# Patient Record
Sex: FEMALE | Race: BLACK OR AFRICAN AMERICAN | Employment: FULL TIME | URBAN - METROPOLITAN AREA
[De-identification: names, ages, dates, MRNs, and addresses within clinical notes are randomized per-mention and may not be internally consistent; named-entity substitution may affect disease eponyms.]

---

## 2017-02-13 ENCOUNTER — OFFICE VISIT (OUTPATIENT)
Dept: INTERNAL MEDICINE CLINIC | Age: 62
End: 2017-02-13

## 2017-02-13 VITALS
BODY MASS INDEX: 27.99 KG/M2 | WEIGHT: 168 LBS | TEMPERATURE: 98.3 F | HEIGHT: 65 IN | SYSTOLIC BLOOD PRESSURE: 122 MMHG | HEART RATE: 90 BPM | DIASTOLIC BLOOD PRESSURE: 77 MMHG | RESPIRATION RATE: 12 BRPM

## 2017-02-13 DIAGNOSIS — I10 ESSENTIAL HYPERTENSION: ICD-10-CM

## 2017-02-13 DIAGNOSIS — F33.41 RECURRENT MAJOR DEPRESSIVE DISORDER, IN PARTIAL REMISSION (HCC): ICD-10-CM

## 2017-02-13 DIAGNOSIS — T38.0X5A STEROID-INDUCED DIABETES MELLITUS (HCC): Primary | ICD-10-CM

## 2017-02-13 DIAGNOSIS — Z12.31 VISIT FOR SCREENING MAMMOGRAM: ICD-10-CM

## 2017-02-13 DIAGNOSIS — L12.30 EBA (EPIDERMOLYSIS BULLOSA ACQUISITA) (HCC): ICD-10-CM

## 2017-02-13 DIAGNOSIS — E09.9 STEROID-INDUCED DIABETES MELLITUS (HCC): Primary | ICD-10-CM

## 2017-02-13 DIAGNOSIS — E78.5 HYPERLIPIDEMIA WITH TARGET LDL LESS THAN 130: ICD-10-CM

## 2017-02-13 LAB — HBA1C MFR BLD HPLC: 4.5 %

## 2017-02-13 RX ORDER — BUPROPION HYDROCHLORIDE 150 MG/1
150 TABLET ORAL
Qty: 30 TAB | Refills: 1 | Status: SHIPPED | OUTPATIENT
Start: 2017-02-13 | End: 2017-04-11 | Stop reason: SDUPTHER

## 2017-02-13 NOTE — PROGRESS NOTES
Patient states she is here to follow up on her blood pressure, sugars. She has lost weight. States secondary to being all of Prednisone.

## 2017-02-13 NOTE — MR AVS SNAPSHOT
Visit Information Date & Time Provider Department Dept. Phone Encounter #  
 2/13/2017  2:30 PM Glennda Baumgarten, MD [facility] [address] [id number] Upcoming Health Maintenance Date Due  
 BREAST CANCER SCRN MAMMOGRAM 1/1/2014 MICROALBUMIN Q1 8/20/2016 EYE EXAM RETINAL OR DILATED Q1 8/25/2016 HEMOGLOBIN A1C Q6M 2/28/2017 FOOT EXAM Q1 2/13/2018 LIPID PANEL Q1 2/13/2018 DTaP/Tdap/Td series (2 - Td) 10/4/2020 COLONOSCOPY 9/26/2021 Allergies as of 2/13/2017  Review Complete On: 2/13/2017 By: Glennda Baumgarten, MD  
  
 Severity Noted Reaction Type Reactions Azathioprine  10/25/2015    Other (comments) Elevated Cr and elevated LFTs Methotrexate  01/13/2015    Other (comments)  
 headache Current Immunizations  Reviewed on 2/13/2017 Name Date Influenza Vaccine 10/2/2013 Influenza Vaccine PF 10/2/2014 Influenza Vaccine Split 10/18/2012, 10/4/2010 Pneumococcal Conjugate (PCV-13) 10/22/2015 TDAP Vaccine 10/4/2010 Reviewed by Glennda Baumgarten, MD on 2/13/2017 at  3:29 PM  
You Were Diagnosed With   
  
 Codes Comments Steroid-induced diabetes mellitus ([facility]    -  Primary ICD-10-CM: E09.9, T38.0X5A 
ICD-9-CM: 249.00, E980.4 EBA (epidermolysis bullosa acquisita)     ICD-10-CM: L12.30 ICD-9-CM: 694.8 Essential hypertension     ICD-10-CM: I10 
ICD-9-CM: 401.9 Hyperlipidemia with target LDL less than 130     ICD-10-CM: E78.5 ICD-9-CM: 272.4 Visit for screening mammogram     ICD-10-CM: Z12.31 
ICD-9-CM: V76.12 Recurrent major depressive disorder, in partial remission ([facility]     ICD-10-CM: F33.41 
ICD-9-CM: 296.35 Vitals BP Pulse Temp Resp Height(growth percentile) Weight(growth percentile) 122/77 (BP 1 Location: Left arm, BP Patient Position: Sitting) 90 98.3 °F (36.8 °C) (Oral) 12 5' 5\" (1.651 m) 168 lb (76.2 kg) LMP BMI OB Status Smoking Status 01/01/2002 27.96 kg/m2 Hysterectomy Former Smoker Vitals History BMI and BSA Data Body Mass Index Body Surface Area  
 27.96 kg/m 2 1.87 m 2 Preferred Pharmacy Pharmacy Name Phone [facility] [address] [place] [address] [phone] Your Updated Medication List  
  
   
This list is accurate as of: 2/13/17  3:35 PM.  Always use your most recent med list.  
  
  
  
  
 albuterol 90 mcg/actuation inhaler Commonly known as:  PROAIR HFA Take 2 Puffs by inhalation every four (4) hours as needed for Wheezing or Shortness of Breath. ALPRAZolam 0.5 mg tablet Commonly known as:  Mariferkateryna Antonioin Take 1 Tab by mouth three (3) times daily as needed for Sleep or Anxiety. buPROPion  mg tablet Commonly known as:  Shaka Gordon Take 1 Tab by mouth every morning. Decreased dose 2/13/17 CRESTOR 40 mg tablet Generic drug:  rosuvastatin Take 1 Tab by mouth nightly. For cholesterol  
  
 dapsone 25 mg tablet Take  by mouth daily. glucose blood VI test strips strip Commonly known as:  ONETOUCH ULTRA TEST  
USE TO CHECK BLOOD SUGAR THREE TIMES A DAY BEFORE MEALS  
  
 lisinopril 10 mg tablet Commonly known as:  Emanuel Shutters Take 1 Tab by mouth daily. Indications: Hypertension Prescriptions Sent to Pharmacy Refills buPROPion XL (WELLBUTRIN XL) 150 mg tablet 1 Sig: Take 1 Tab by mouth every morning. Decreased dose 2/13/17 Class: Normal  
 Pharmacy: [facility] [address] [place] [address] Ph #: [phone] Route: Oral  
  
We Performed the Following AMB POC HEMOGLOBIN A1C [34736 CPT(R)] CBC W/O DIFF [75883 CPT(R)] LIPID PANEL [88093 CPT(R)] METABOLIC PANEL, COMPREHENSIVE [59726 CPT(R)] MICROALBUMIN, UR, RAND W/ MICROALBUMIN/CREA RATIO [id number] CPT(R)] To-Do List   
 Around 02/13/2017   Imaging:  RAMIRE[facility] MAMMO BI SCREENING INCL CAD   
  
  
 Introducing [facility] & HEALTH SERVICES! Dear Chloe Salazar: Thank you for requesting a [employer] account. Our records indicate that you already have an active [employer] account. You can access your account anytime at [email or web address] Did you know that you can access your hospital and ER discharge instructions at any time in [employer]? You can also review all of your test results from your hospital stay or ER visit. Additional Information If you have questions, please visit the Frequently Asked Questions section of the [employer] website at [email or web address] Remember, [employer] is NOT to be used for urgent needs. For medical emergencies, dial 911. Now available from your iPhone and Android! Please provide this summary of care documentation to your next provider. Your primary care clinician is listed as Edith Carrillo. If you have any questions after today's visit, please call [phone].

## 2017-02-14 LAB
ALBUMIN SERPL-MCNC: 4.8 G/DL (ref 3.6–4.8)
ALBUMIN/CREAT UR: 163.8 MG/G CREAT (ref 0–30)
ALBUMIN/GLOB SERPL: 2.1 {RATIO} (ref 1.1–2.5)
ALP SERPL-CCNC: 95 IU/L (ref 39–117)
ALT SERPL-CCNC: 33 IU/L (ref 0–32)
AST SERPL-CCNC: 37 IU/L (ref 0–40)
BILIRUB SERPL-MCNC: 1 MG/DL (ref 0–1.2)
BUN SERPL-MCNC: 14 MG/DL (ref 8–27)
BUN/CREAT SERPL: 10 (ref 11–26)
CALCIUM SERPL-MCNC: 10 MG/DL (ref 8.7–10.3)
CHLORIDE SERPL-SCNC: 103 MMOL/L (ref 96–106)
CHOLEST SERPL-MCNC: 177 MG/DL (ref 100–199)
CO2 SERPL-SCNC: 22 MMOL/L (ref 18–29)
CREAT SERPL-MCNC: 1.35 MG/DL (ref 0.57–1)
CREAT UR-MCNC: 117.8 MG/DL
ERYTHROCYTE [DISTWIDTH] IN BLOOD BY AUTOMATED COUNT: 17 % (ref 12.3–15.4)
GLOBULIN SER CALC-MCNC: 2.3 G/DL (ref 1.5–4.5)
GLUCOSE SERPL-MCNC: 88 MG/DL (ref 65–99)
HCT VFR BLD AUTO: 31.3 % (ref 34–46.6)
HDLC SERPL-MCNC: 52 MG/DL
HGB BLD-MCNC: 10 G/DL (ref 11.1–15.9)
INTERPRETATION, 910389: NORMAL
INTERPRETATION: NORMAL
LDLC SERPL CALC-MCNC: 99 MG/DL (ref 0–99)
Lab: NORMAL
MCH RBC QN AUTO: 34.1 PG (ref 26.6–33)
MCHC RBC AUTO-ENTMCNC: 31.9 G/DL (ref 31.5–35.7)
MCV RBC AUTO: 107 FL (ref 79–97)
MICROALBUMIN UR-MCNC: 193 UG/ML
PDF IMAGE, 910387: NORMAL
PLATELET # BLD AUTO: 222 X10E3/UL (ref 150–379)
POTASSIUM SERPL-SCNC: 4.1 MMOL/L (ref 3.5–5.2)
PROT SERPL-MCNC: 7.1 G/DL (ref 6–8.5)
RBC # BLD AUTO: 2.93 X10E6/UL (ref 3.77–5.28)
SODIUM SERPL-SCNC: 141 MMOL/L (ref 134–144)
TRIGL SERPL-MCNC: 129 MG/DL (ref 0–149)
VLDLC SERPL CALC-MCNC: 26 MG/DL (ref 5–40)
WBC # BLD AUTO: 8.7 X10E3/UL (ref 3.4–10.8)

## 2017-03-06 ENCOUNTER — TELEPHONE (OUTPATIENT)
Dept: INTERNAL MEDICINE CLINIC | Age: 62
End: 2017-03-06

## 2017-03-06 NOTE — TELEPHONE ENCOUNTER
Pt wants to know if she needs to come in and see Dr. Audelia Grider.  States she was sitting at her desk and had a sudden nose bleed [phone]

## 2017-03-06 NOTE — TELEPHONE ENCOUNTER
Nosebleed at work today,was advised to have  Bp checked, use Saline Nasal spray, to be seen if concerned, appointment made.

## 2017-03-09 ENCOUNTER — TELEPHONE (OUTPATIENT)
Dept: INTERNAL MEDICINE CLINIC | Age: 62
End: 2017-03-09

## 2017-03-09 NOTE — TELEPHONE ENCOUNTER
I spoke with her dermatologist, Dr. Brittany Polo. Patient has worsening anemia, hgb 9.5. Folate level low 4. Trans sat 22%, iron borderline. Concern about dapsone, due for colonoscopy, consider nephrology consult for epo given CKD. Will see pt next week and I can order epo level, stool FIT and refer to GI.

## 2017-03-21 ENCOUNTER — HOSPITAL ENCOUNTER (OUTPATIENT)
Dept: MAMMOGRAPHY | Age: 62
Discharge: HOME | End: 2017-03-21
Attending: INTERNAL MEDICINE
Payer: COMMERCIAL

## 2017-03-21 DIAGNOSIS — Z12.31 VISIT FOR SCREENING MAMMOGRAM: ICD-10-CM

## 2017-03-21 PROCEDURE — 77067 SCR MAMMO BI INCL CAD: CPT

## 2017-03-23 ENCOUNTER — HOSPITAL ENCOUNTER (OUTPATIENT)
Dept: MAMMOGRAPHY | Age: 62
Discharge: HOME | End: 2017-03-23
Payer: COMMERCIAL

## 2017-03-23 DIAGNOSIS — R92.8 ABNORMAL MAMMOGRAM: ICD-10-CM

## 2017-03-23 PROCEDURE — 77065 DX MAMMO INCL CAD UNI: CPT

## 2017-04-11 DIAGNOSIS — F33.41 RECURRENT MAJOR DEPRESSIVE DISORDER, IN PARTIAL REMISSION (HCC): ICD-10-CM

## 2017-04-11 DIAGNOSIS — E78.5 HYPERLIPIDEMIA WITH TARGET LDL LESS THAN 130: ICD-10-CM

## 2017-04-11 RX ORDER — LISINOPRIL 10 MG/1
TABLET ORAL
Qty: 30 TAB | Refills: 2 | Status: SHIPPED | OUTPATIENT
Start: 2017-04-11 | End: 2017-04-11 | Stop reason: SDUPTHER

## 2017-04-11 RX ORDER — BUPROPION HYDROCHLORIDE 150 MG/1
TABLET ORAL
Qty: 30 TAB | Refills: 0 | Status: SHIPPED | OUTPATIENT
Start: 2017-04-11 | End: 2017-04-11 | Stop reason: SDUPTHER

## 2017-04-13 ENCOUNTER — OFFICE VISIT (OUTPATIENT)
Dept: INTERNAL MEDICINE CLINIC | Age: 62
End: 2017-04-13

## 2017-04-13 VITALS
RESPIRATION RATE: 12 BRPM | HEIGHT: 65 IN | TEMPERATURE: 98.7 F | BODY MASS INDEX: 28.82 KG/M2 | SYSTOLIC BLOOD PRESSURE: 128 MMHG | DIASTOLIC BLOOD PRESSURE: 74 MMHG | HEART RATE: 76 BPM | WEIGHT: 173 LBS

## 2017-04-13 DIAGNOSIS — D64.9 ANEMIA, UNSPECIFIED: Primary | ICD-10-CM

## 2017-04-13 DIAGNOSIS — E53.8 FOLIC ACID DEFICIENCY: ICD-10-CM

## 2017-04-13 DIAGNOSIS — N18.30 CKD (CHRONIC KIDNEY DISEASE) STAGE 3, GFR 30-59 ML/MIN (HCC): ICD-10-CM

## 2017-04-13 DIAGNOSIS — Z12.11 ENCOUNTER FOR SCREENING FECAL OCCULT BLOOD TESTING: ICD-10-CM

## 2017-04-13 LAB — HGB BLD-MCNC: 9.8 G/DL

## 2017-04-13 RX ORDER — FOLIC ACID 1 MG/1
1 TABLET ORAL
COMMUNITY
Start: 2017-04-09 | End: 2019-12-20 | Stop reason: ALTCHOICE

## 2017-04-13 RX ORDER — DAPSONE 25 MG/1
50 TABLET ORAL DAILY
Qty: 60 TAB | Refills: 4
Start: 2017-04-13 | End: 2017-10-02 | Stop reason: SDUPTHER

## 2017-04-13 NOTE — MR AVS SNAPSHOT
Visit Information Date & Time Provider Department Dept. Phone Encounter #  
 4/13/2017  4:00 PM Bib Melo MD [facility] [address] [id number] Upcoming Health Maintenance Date Due  
 EYE EXAM RETINAL OR DILATED Q1 8/25/2016 HEMOGLOBIN A1C Q6M 8/13/2017 FOOT EXAM Q1 2/13/2018 MICROALBUMIN Q1 2/13/2018 LIPID PANEL Q1 2/13/2018 BREAST CANCER SCRN MAMMOGRAM 3/23/2019 DTaP/Tdap/Td series (2 - Td) 10/4/2020 COLONOSCOPY 9/26/2021 Allergies as of 4/13/2017  Review Complete On: 4/13/2017 By: Bib Melo MD  
  
 Severity Noted Reaction Type Reactions Azathioprine  10/25/2015    Other (comments) Elevated Cr and elevated LFTs Methotrexate  01/13/2015    Other (comments)  
 headache Current Immunizations  Reviewed on 2/13/2017 Name Date Influenza Vaccine 10/2/2013 Influenza Vaccine PF 10/2/2014 Influenza Vaccine Split 10/18/2012, 10/4/2010 Pneumococcal Conjugate (PCV-13) 10/22/2015 TDAP Vaccine 10/4/2010 Not reviewed this visit You Were Diagnosed With   
  
 Codes Comments Anemia, unspecified    -  Primary ICD-10-CM: D64.9 ICD-9-CM: 285.9 Folic acid deficiency     ICD-10-CM: E53.8 ICD-9-CM: 266.2 Encounter for screening fecal occult blood testing     ICD-10-CM: Z12.11 ICD-9-CM: V76.51 CKD (chronic kidney disease) stage 3, GFR 30-59 ml/min     ICD-10-CM: N18.3 ICD-9-CM: 549. 3 Vitals BP Pulse Temp Resp Height(growth percentile) Weight(growth percentile) 128/74 (BP 1 Location: Left arm, BP Patient Position: Sitting) 76 98.7 °F (37.1 °C) (Oral) 12 5' 5\" (1.651 m) 173 lb (78.5 kg) LMP BMI OB Status Smoking Status 01/01/2002 28.79 kg/m2 Hysterectomy Former Smoker Vitals History BMI and BSA Data Body Mass Index Body Surface Area 28.79 kg/m 2 1.9 m 2 Preferred Pharmacy Pharmacy Name Phone [facility] [address] [phone] Your Updated Medication List  
  
   
This list is accurate as of: 4/13/17  5:06 PM.  Always use your most recent med list.  
  
  
  
  
 albuterol 90 mcg/actuation inhaler Commonly known as:  PROAIR HFA Take 2 Puffs by inhalation every four (4) hours as needed for Wheezing or Shortness of Breath. ALPRAZolam 0.5 mg tablet Commonly known as:  Kathlapurva Medley Take 1 Tab by mouth three (3) times daily as needed for Sleep or Anxiety. buPROPion  mg tablet Commonly known as:  Carola Ni Take 1 Tab by mouth every morning. CRESTOR 40 mg tablet Generic drug:  rosuvastatin Take 1 Tab by mouth nightly. For cholesterol  
  
 dapsone 25 mg tablet Take 2 Tabs by mouth daily. folic acid 1 mg tablet Commonly known as:  FOLVITE  
1 mg. 5 tablets daily  
  
 glucose blood VI test strips strip Commonly known as:  ONETOUCH ULTRA TEST  
USE TO CHECK BLOOD SUGAR THREE TIMES A DAY BEFORE MEALS  
  
 lisinopril 10 mg tablet Commonly known as:  [place] Neve Take 1 Tab by mouth daily. We Performed the Following AMB POC HEMOGLOBIN (HGB) [40710 CPT(R)] ERYTHROPOIETIN [56971 CPT(R)] FOLATE [id number] CPT(R)]   
 LD [id number] CPT(R)] METABOLIC PANEL, BASIC [99045 CPT(R)] OCCULT BLOOD, IMMUNOASSAY (FIT) [id number] CPT(R)] REFERRAL TO GASTROENTEROLOGY [KPP39 Custom] Comments:  
 Please evaluate patient for screening colonoscopy. REFERRAL TO NEPHROLOGY [NXC51 Custom] Comments:  
 Please evaluate patient for anemia, mild CKD. RETICULOCYTE COUNT [id number] CPT(R)] Referral Information Referral ID Referred By Referred To  
  
 [id number] Radha [facility] [address] [place] [address] [phone] Jonathan Cabello Visits Status Start Date End Date 1 New Request 4/13/17 4/13/18 If your referral has a status of pending review or denied, additional information will be sent to support the outcome of this decision. Referral ID Referred By Referred To  
 [id number] Julissa [facility], 21 Located within [facility] Visits Status Start Date End Date 1 New Request 4/13/17 4/13/18 If your referral has a status of pending review or denied, additional information will be sent to support the outcome of this decision. Patient Instructions Repeat labs around 5/15/17 Introducing [facility] & [facility] SERVICES! Dear Shelia Carrillo: Thank you for requesting a [employer] account. Our records indicate that you already have an active [employer] account. You can access your account anytime at [email or web address] Did you know that you can access your hospital and ER discharge instructions at any time in [employer]? You can also review all of your test results from your hospital stay or ER visit. Additional Information If you have questions, please visit the Frequently Asked Questions section of the [employer] website at [email or web address] Remember, [employer] is NOT to be used for urgent needs. For medical emergencies, dial 911. Now available from your iPhone and Android! Please provide this summary of care documentation to your next provider. Your primary care clinician is listed as Rigoberto Schultz. If you have any questions after today's visit, please call [phone].

## 2017-04-15 NOTE — PROGRESS NOTES
HISTORY OF PRESENT ILLNESS    Chief Complaint   Patient presents with    Anemia       Presents for follow-up of anemia. Seeing Dr. Jeff Espinoza. Her hgb was was 9.3 3/7/17. Folic acid was low. Taking folic acid. She recommended colonoscopy and renal referral.  Iron level was normal.    Her Cr was 1.3  hgb is 9.8 today. She is on a lower dose of dapsone. 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 has a past surgical history that includes colonoscopy (09/26/2011) and hina and bso (2002). reports that she quit smoking about 9 years ago. Her smoking use included Cigarettes. She has a 5.00 pack-year smoking history. She has never used smokeless tobacco. She reports that she does not drink alcohol or use illicit drugs. family history includes Heart Disease in an other family member; Hypertension in her mother; Stroke in her father. Physical Exam   Nursing note and vitals reviewed. Blood pressure 128/74, pulse 76, temperature 98.7 °F (37.1 °C), temperature source Oral, resp. rate 12, height 5' 5\" (1.651 m), weight 173 lb (78.5 kg), last menstrual period 01/01/2002. Constitutional:  No distress. Eyes: Conjunctivae are normal.   Ears:  Hearing grossly intact  Cardiovascular: Normal rate. regular rhythm, no murmurs or gallops  No edema  Pulmonary/Chest: Effort normal.   CTAB  Musculoskeletal: moves all 4 extremities   Neurological: Alert and oriented to person, place, and time. Skin: No rash noted.    Psychiatric: Normal mood and affect. Behavior is normal.     ASSESSMENT and PLAN  Eloina Fajardo was seen today for anemia. Diagnoses and all orders for this visit:    Anemia, unspecified - unclear etiology. Folate def? Dapsone? Check FOBT and also refer for colonoscopy. Repeat labs 1 month. If worsening, will also refer to renal.  F/u Dr. Riaz Wall in June. -     AMB POC HEMOGLOBIN (HGB)  -     REFERRAL TO GASTROENTEROLOGY  -     REFERRAL TO NEPHROLOGY  -     ERYTHROPOIETIN  -     RETICULOCYTE COUNT  -     LDH  -     FOLATE  -     METABOLIC PANEL, BASIC    Folic acid deficiency  -     FOLATE    Encounter for screening fecal occult blood testing  -     OCCULT BLOOD, IMMUNOASSAY (FIT)  -     REFERRAL TO GASTROENTEROLOGY  -     REFERRAL TO NEPHROLOGY    CKD (chronic kidney disease) stage 3, GFR 30-59 ml/min  -     REFERRAL TO NEPHROLOGY  -     METABOLIC PANEL, BASIC    Other orders  -     dapsone 25 mg tablet; Take 2 Tabs by mouth daily. Patient Instructions     Repeat labs around 5/15/17          lab results and schedule of future lab studies reviewed with patient  reviewed medications and side effects in detail    Return to clinic for further evaluation if new symptoms develop    Follow-up Disposition: Not on File    Current Outpatient Prescriptions   Medication Sig    folic acid (FOLVITE) 1 mg tablet 1 mg. 5 tablets daily    dapsone 25 mg tablet Take 2 Tabs by mouth daily.  lisinopril (PRINIVIL, ZESTRIL) 10 mg tablet Take 1 Tab by mouth daily.  buPROPion XL (WELLBUTRIN XL) 150 mg tablet Take 1 Tab by mouth every morning.  CRESTOR 40 mg tablet Take 1 Tab by mouth nightly. For cholesterol    ALPRAZolam (XANAX) 0.5 mg tablet Take 1 Tab by mouth three (3) times daily as needed for Sleep or Anxiety.     glucose blood VI test strips (ONETOUCH ULTRA TEST) strip USE TO CHECK BLOOD SUGAR THREE TIMES A DAY BEFORE MEALS    albuterol (PROAIR HFA) 90 mcg/actuation inhaler Take 2 Puffs by inhalation every four (4) hours as needed for Wheezing or Shortness of Breath. No current facility-administered medications for this visit.

## 2017-04-30 LAB — HEMOCCULT STL QL IA: NEGATIVE

## 2017-05-16 LAB
BUN SERPL-MCNC: 11 MG/DL (ref 8–27)
BUN/CREAT SERPL: 11 (ref 12–28)
CALCIUM SERPL-MCNC: 9.9 MG/DL (ref 8.7–10.3)
CHLORIDE SERPL-SCNC: 103 MMOL/L (ref 96–106)
CO2 SERPL-SCNC: 24 MMOL/L (ref 18–29)
CREAT SERPL-MCNC: 0.97 MG/DL (ref 0.57–1)
EPO SERPL-ACNC: 29.9 MIU/ML (ref 2.6–18.5)
FOLATE SERPL-MCNC: >20 NG/ML
GLUCOSE SERPL-MCNC: 81 MG/DL (ref 65–99)
INTERPRETATION: NORMAL
LDH SERPL-CCNC: 180 IU/L (ref 119–226)
POTASSIUM SERPL-SCNC: 3.9 MMOL/L (ref 3.5–5.2)
RETICS/RBC NFR AUTO: 1.6 % (ref 0.6–2.6)
SODIUM SERPL-SCNC: 142 MMOL/L (ref 134–144)

## 2017-05-17 ENCOUNTER — OFFICE VISIT (OUTPATIENT)
Dept: INTERNAL MEDICINE CLINIC | Age: 62
End: 2017-05-17

## 2017-05-17 VITALS
SYSTOLIC BLOOD PRESSURE: 111 MMHG | RESPIRATION RATE: 12 BRPM | HEART RATE: 74 BPM | TEMPERATURE: 98.8 F | DIASTOLIC BLOOD PRESSURE: 68 MMHG | BODY MASS INDEX: 28.82 KG/M2 | HEIGHT: 65 IN | WEIGHT: 173 LBS

## 2017-05-17 DIAGNOSIS — D64.9 ANEMIA, UNSPECIFIED TYPE: Primary | ICD-10-CM

## 2017-05-17 DIAGNOSIS — F40.243 FEAR OF FLYING: ICD-10-CM

## 2017-05-17 LAB — HGB BLD-MCNC: 10.6 G/DL

## 2017-05-17 RX ORDER — ALPRAZOLAM 0.5 MG/1
0.5 TABLET ORAL
Qty: 20 TAB | Refills: 0 | Status: SHIPPED | OUTPATIENT
Start: 2017-05-17 | End: 2017-08-02 | Stop reason: SDUPTHER

## 2017-05-17 RX ORDER — SCOLOPAMINE TRANSDERMAL SYSTEM 1 MG/1
1 PATCH, EXTENDED RELEASE TRANSDERMAL
Qty: 5 PATCH | Refills: 0 | Status: SHIPPED | OUTPATIENT
Start: 2017-05-17 | End: 2017-10-02 | Stop reason: SDUPTHER

## 2017-05-17 NOTE — LETTER
5/17/2017 4:55 PM 
 
Ms. Jairo Herrera [address] [facility] [id number] Dear Jairo Herrera: Please find your most recent results below. Resulted Orders AMB POC HEMOGLOBIN (HGB) Result Value Ref Range Hemoglobin (POC) 9.8 OCCULT BLOOD, IMMUNOASSAY (FIT) Result Value Ref Range Occult Blood fecal, by IA Negative Negative Narrative Performed at:  [address]  [id number] : Karl Reyes MD, Phone:  [phone] ERYTHROPOIETIN Result Value Ref Range Erythropoietin 29.9 (H) 2.6 - 18.5 mIU/mL Narrative Performed at:  [address]  [id number] : Karl Reyes MD, Phone:  [phone] RETICULOCYTE COUNT Result Value Ref Range Reticulocyte count 1.6 0.6 - 2.6 % Narrative Performed at:  [address]  [id number] : Karl Reyes MD, Phone:  [phone] LD Result Value Ref Range  119 - 226 IU/L Narrative Performed at:  [address]  [id number] : Karl Reyes MD, Phone:  [phone] FOLATE Result Value Ref Range Folate >20.0 >3.0 ng/mL Comment: A serum folate concentration of less than 3.1 ng/mL is 
considered to represent clinical deficiency. Narrative Performed at:  [address]  [id number] : Karl Reyes MD, Phone:  [phone] METABOLIC PANEL, BASIC Result Value Ref Range Glucose 81 65 - 99 mg/dL BUN 11 8 - 27 mg/dL Creatinine 0.97 0.57 - 1.00 mg/dL GFR est non-AA 62 >59 mL/min/1.73 GFR est AA 72 >59 mL/min/1.73  
 BUN/Creatinine ratio 11 (L) 12 - 28 Sodium 142 134 - 144 mmol/L Potassium 3.9 3.5 - 5.2 mmol/L  Chloride 103 96 - 106 mmol/L  
 CO2 24 18 - 29 mmol/L  
 Calcium 9.9 8.7 - 10.3 mg/dL Narrative Performed at:  [address]  [id number] : Karlie Spivey MD, Phone:  [phone] CKD REPORT Result Value Ref Range Interpretation Note Comment:  
   ------------------------------- 
CHRONIC KIDNEY DISEASE: 
EGFR, BLOOD PRESSURE, AND PROTEINURIA ASSESSMENT Patient was previously in CKD stage 3b, eGFR is now above 60 
and outside the scope of the program. 
- 
------------------------------- DISCLAIMER These assessments and treatment suggestions are provided as 
a convenience in support of the physician-patient 
relationship and are not intended to replace the physician's 
clinical judgment. They are derived from the national 
guidelines in addition to other evidence and expert opinion. The clinician should consider this information within the 
context of clinical opinion and the individual patient. SEE GUIDANCE FOR CHRONIC KIDNEY DISEASE PROGRAM: National 
Kidney Foundation Kidney Disease Outcomes Quality Initiative 
(KDOQI (TM)), with its limitations and disclaimers, are at 
www.kidney. org/professionals/KDOQI. Kidney Disease Improving Global Outcomes (KDIGO) clinical practice guidelines are at 
http://kdigo. org/home/guidelines 
/. The members of 
[address] national advisory panel are listed at 
www. Litholink.com. This program is intended for patients who 
have been diagnosed with stages 3, 4, or pre-dialysis 5 CKD. It is not intended for children, pregnant patients, or 
transplant patients. Narrative Performed at:  [address] [place] [address]  [id number] : Danny Archuleta PhD, Phone:  [phone] Please call me if you have any questions: [phone] Sincerely, 
 
 
Katheryn Ewing MD

## 2017-05-17 NOTE — MR AVS SNAPSHOT
Visit Information Date & Time Provider Department Dept. Phone Encounter #  
 5/17/2017  3:45 PM Carry MD [place] [facility] [address] [id number] Follow-up Instructions Return in about 2 months (around 7/17/2017) for blood cell check. Upcoming Health Maintenance Date Due  
 EYE EXAM RETINAL OR DILATED Q1 8/25/2016 INFLUENZA AGE 9 TO ADULT 8/1/2017 HEMOGLOBIN A1C Q6M 8/13/2017 FOOT EXAM Q1 2/13/2018 MICROALBUMIN Q1 2/13/2018 LIPID PANEL Q1 2/13/2018 BREAST CANCER SCRN MAMMOGRAM 3/23/2019 DTaP/Tdap/Td series (2 - Td) 10/4/2020 COLONOSCOPY 9/26/2021 Allergies as of 5/17/2017  Review Complete On: 5/17/2017 By: Abel Alfonso Severity Noted Reaction Type Reactions Azathioprine  10/25/2015    Other (comments) Elevated Cr and elevated LFTs Methotrexate  01/13/2015    Other (comments)  
 headache Current Immunizations  Reviewed on 2/13/2017 Name Date Influenza Vaccine 10/2/2013 Influenza Vaccine PF 10/2/2014 Influenza Vaccine Split 10/18/2012, 10/4/2010 Pneumococcal Conjugate (PCV-13) 10/22/2015 TDAP Vaccine 10/4/2010 Not reviewed this visit You Were Diagnosed With   
  
 Codes Comments Anemia, unspecified type    -  Primary ICD-10-CM: D64.9 ICD-9-CM: 285.9 Fear of flying     ICD-10-CM: V08.840 ICD-9-CM: 300.29 Vitals BP Pulse Temp Resp Height(growth percentile) Weight(growth percentile) 111/68 (BP 1 Location: Left arm, BP Patient Position: Sitting) 74 98.8 °F (37.1 °C) (Oral) 12 5' 5\" (1.651 m) 173 lb (78.5 kg) LMP BMI OB Status Smoking Status 01/01/2002 28.79 kg/m2 Hysterectomy Former Smoker Vitals History BMI and BSA Data Body Mass Index Body Surface Area 28.79 kg/m 2 1.9 m 2 Preferred Pharmacy Pharmacy Name Phone [facility] [place] [address] [phone] Your Updated Medication List  
  
   
This list is accurate as of: 17  4:56 PM.  Always use your most recent med list.  
  
  
  
  
 albuterol 90 mcg/actuation inhaler Commonly known as:  PROAIR HFA Take 2 Puffs by inhalation every four (4) hours as needed for Wheezing or Shortness of Breath. ALPRAZolam 0.5 mg tablet Commonly known as:  Brynda Flies Take 1 Tab by mouth three (3) times daily as needed for Sleep or Anxiety. buPROPion  mg tablet Commonly known as:  Alem Dine Take 1 Tab by mouth every morning. CRESTOR 40 mg tablet Generic drug:  rosuvastatin Take 1 Tab by mouth nightly. For cholesterol  
  
 dapsone 25 mg tablet Take 2 Tabs by mouth daily. folic acid 1 mg tablet Commonly known as:  FOLVITE  
1 mg. 5 tablets daily  
  
 glucose blood VI test strips strip Commonly known as:  ONETOUCH ULTRA TEST  
USE TO CHECK BLOOD SUGAR THREE TIMES A DAY BEFORE MEALS  
  
 lisinopril 10 mg tablet Commonly known as:  Maryana Collins Take 1 Tab by mouth daily. scopolamine 1.5 mg (1 mg over 3 days) Pt3d Commonly known as:  TRANSDERM-SCOP  
1 Patch by TransDERmal route every seventy-two (72) hours. Prescriptions Printed Refills ALPRAZolam (XANAX) 0.5 mg tablet 0 Sig: Take 1 Tab by mouth three (3) times daily as needed for Sleep or Anxiety. Class: Print Route: Oral  
 scopolamine (TRANSDERM-SCOP) 1.5 mg (1 mg over 3 days) pt3d 0 Si Patch by TransDERmal route every seventy-two (72) hours. Class: Print Route: TransDERmal  
  
We Performed the Following AMB POC HEMOGLOBIN (HGB) [17540 CPT(R)] Follow-up Instructions Return in about 2 months (around 2017) for blood cell check. Introducing [facility] & HEALTH SERVICES! Dear Meka Perez: Thank you for requesting a [employer] account. Our records indicate that you already have an active [employer] account.   You can access your account anytime at [email or web address] Did you know that you can access your hospital and ER discharge instructions at any time in Pluromed? You can also review all of your test results from your hospital stay or ER visit. Additional Information If you have questions, please visit the Frequently Asked Questions section of the [employer] website at [email or web address] Remember, [employer]ed is NOT to be used for urgent needs. For medical emergencies, dial 911. Now available from your iPhone and Android! Please provide this summary of care documentation to your next provider. Your primary care clinician is listed as Eliseo Gao. If you have any questions after today's visit, please call [phone].

## 2017-05-19 NOTE — PROGRESS NOTES
HISTORY OF PRESENT ILLNESS    Chief Complaint   Patient presents with    Anemia       Presents for follow-up of anemia. Referred for further evaluation by her dermatologist, Dr. France Lackey. Labs at [facility] 3/16/17 showed Hgb 9.3, folic acid 4.4, Cr 1.3. Normal iron studies. She decreased dapsone. hbb was then 9.8 4/13/17  Fecal occult blood negative 4/23/17. Labs done 3/44/46 showed folic acid > 20, increased erythropoietin, normal reticulocytes, normal LDH, CR 0.97  Hgb today is 10.6 (5/17/17)  She is feeling well. Denies rectal bleeding. Last colonoscopy 9.26.11 showed hemorrhoids - Dr. Talon Ramirez. 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 has a past surgical history that includes colonoscopy (09/26/2011) and hina and bso (2002). reports that she quit smoking about 9 years ago. Her smoking use included Cigarettes. She has a 5.00 pack-year smoking history. She has never used smokeless tobacco. She reports that she does not drink alcohol or use illicit drugs. family history includes Heart Disease in an other family member; Hypertension in her mother; Stroke in her father. Physical Exam   Nursing note and vitals reviewed. Blood pressure 111/68, pulse 74, temperature 98.8 °F (37.1 °C), temperature source Oral, resp. rate 12, height 5' 5\" (1.651 m), weight 173 lb (78.5 kg), last menstrual period 01/01/2002. Constitutional:  No distress. Eyes: Conjunctivae are normal.   Ears:  Hearing grossly intact  Cardiovascular: Normal rate.   regular rhythm, no murmurs or gallops  No edema  Pulmonary/Chest: Effort normal.   CTAB  Musculoskeletal: moves all 4 extremities   Neurological: Alert and oriented to person, place, and time. Skin: No rash noted. Psychiatric: Normal mood and affect. Behavior is normal.     ASSESSMENT and PLAN  Aydin Kwon was seen today for anemia. Diagnoses and all orders for this visit:    Anemia, unspecified type  Folic acid deficiency is the likely cause. Dapsone effects also possible, but no sign of aplastic anemia. Perhaps was affecting renal function? Renal function is now normal and EPO level is high, so less likely renal dz. Could consider EGD and colonoscopy, but neg fecal blood and no other s/s and colonoscopy was 5 1/2 years ago. -     AMB POC HEMOGLOBIN (HGB)    Fear of flying- traveling to [facility] on a cruise. -     ALPRAZolam (XANAX) 0.5 mg tablet; Take 1 Tab by mouth three (3) times daily as needed for Sleep or Anxiety. -     scopolamine (TRANSDERM-SCOP) 1.5 mg (1 mg over 3 days) pt3d; 1 Patch by TransDERmal route every seventy-two (72) hours. lab results and schedule of future lab studies reviewed with patient  reviewed medications and side effects in detail  Return to clinic for further evaluation if new symptoms develop    Follow-up Disposition:  Return in about 2 months (around 7/17/2017) for blood cell check. Current Outpatient Prescriptions   Medication Sig    ALPRAZolam (XANAX) 0.5 mg tablet Take 1 Tab by mouth three (3) times daily as needed for Sleep or Anxiety.  scopolamine (TRANSDERM-SCOP) 1.5 mg (1 mg over 3 days) pt3d 1 Patch by TransDERmal route every seventy-two (72) hours.  folic acid (FOLVITE) 1 mg tablet 1 mg. 5 tablets daily    dapsone 25 mg tablet Take 2 Tabs by mouth daily.  lisinopril (PRINIVIL, ZESTRIL) 10 mg tablet Take 1 Tab by mouth daily.  buPROPion XL (WELLBUTRIN XL) 150 mg tablet Take 1 Tab by mouth every morning.  CRESTOR 40 mg tablet Take 1 Tab by mouth nightly. For cholesterol    glucose blood VI test strips (ONETOUCH ULTRA TEST) strip USE TO CHECK BLOOD SUGAR THREE TIMES A DAY BEFORE MEALS    albuterol (PROAIR HFA) 90 mcg/actuation inhaler Take 2 Puffs by inhalation every four (4) hours as needed for Wheezing or Shortness of Breath. No current facility-administered medications for this visit.

## 2017-05-30 DIAGNOSIS — E78.5 HYPERLIPIDEMIA WITH TARGET LDL LESS THAN 130: ICD-10-CM

## 2017-05-30 RX ORDER — ROSUVASTATIN CALCIUM 40 MG/1
40 TABLET, FILM COATED ORAL
Qty: 30 TAB | Refills: 5 | Status: SHIPPED | OUTPATIENT
Start: 2017-05-30 | End: 2017-10-02 | Stop reason: SDUPTHER

## 2017-05-30 NOTE — TELEPHONE ENCOUNTER
From: Yany Rivera  To: Bud Walker MD  Sent: 5/30/2017 2:53 PM EDT  Subject: Medication Renewal Request    Original authorizing provider: MD Yany Kennedy would like a refill of the following medications:  CRESTOR 40 mg tablet Bud Walker MD]    [facility]:

## 2017-08-02 DIAGNOSIS — F40.243 FEAR OF FLYING: ICD-10-CM

## 2017-08-02 RX ORDER — ALPRAZOLAM 0.5 MG/1
0.5 TABLET ORAL
Qty: 20 TAB | Refills: 0 | OUTPATIENT
Start: 2017-08-02 | End: 2020-10-18 | Stop reason: SDUPTHER

## 2017-10-02 ENCOUNTER — OFFICE VISIT (OUTPATIENT)
Dept: INTERNAL MEDICINE CLINIC | Age: 62
End: 2017-10-02

## 2017-10-02 VITALS
HEIGHT: 65 IN | BODY MASS INDEX: 29.66 KG/M2 | TEMPERATURE: 98.8 F | RESPIRATION RATE: 12 BRPM | SYSTOLIC BLOOD PRESSURE: 137 MMHG | DIASTOLIC BLOOD PRESSURE: 86 MMHG | HEART RATE: 77 BPM | WEIGHT: 178 LBS

## 2017-10-02 DIAGNOSIS — L12.30 EBA (EPIDERMOLYSIS BULLOSA ACQUISITA) (HCC): ICD-10-CM

## 2017-10-02 DIAGNOSIS — R74.01 ELEVATED ALT MEASUREMENT: ICD-10-CM

## 2017-10-02 DIAGNOSIS — M75.52 BURSITIS OF LEFT SHOULDER: Primary | ICD-10-CM

## 2017-10-02 DIAGNOSIS — E11.8 DM TYPE 2, CONTROLLED, WITH COMPLICATION (HCC): ICD-10-CM

## 2017-10-02 DIAGNOSIS — D64.9 ANEMIA, UNSPECIFIED TYPE: ICD-10-CM

## 2017-10-02 DIAGNOSIS — I10 ESSENTIAL HYPERTENSION: ICD-10-CM

## 2017-10-02 DIAGNOSIS — Z23 ENCOUNTER FOR IMMUNIZATION: ICD-10-CM

## 2017-10-02 RX ORDER — DICLOFENAC SODIUM 10 MG/G
2 GEL TOPICAL 4 TIMES DAILY
Qty: 100 G | Refills: 2 | Status: SHIPPED | OUTPATIENT
Start: 2017-10-02 | End: 2019-10-07 | Stop reason: ALTCHOICE

## 2017-10-02 RX ORDER — DAPSONE 25 MG/1
50 TABLET ORAL DAILY
Qty: 60 TAB | Refills: 4
Start: 2017-10-02 | End: 2019-12-20 | Stop reason: ALTCHOICE

## 2017-10-02 NOTE — MR AVS SNAPSHOT
Visit Information Date & Time Provider Department Dept. Phone Encounter #  
 10/2/2017  4:15 PM Prasanna Layne MD [facility] [address] [id number] Upcoming Health Maintenance Date Due  
 EYE EXAM RETINAL OR DILATED Q1 8/25/2016 INFLUENZA AGE 9 TO ADULT 8/1/2017 HEMOGLOBIN A1C Q6M 8/13/2017 FOOT EXAM Q1 2/13/2018 MICROALBUMIN Q1 2/13/2018 LIPID PANEL Q1 2/13/2018 BREAST CANCER SCRN MAMMOGRAM 3/23/2019 DTaP/Tdap/Td series (2 - Td) 10/4/2020 COLONOSCOPY 9/26/2021 Allergies as of 10/2/2017  Review Complete On: 10/2/2017 By: Prasanna Layne MD  
  
 Severity Noted Reaction Type Reactions Azathioprine  10/25/2015    Other (comments) Elevated Cr and elevated LFTs Methotrexate  01/13/2015    Other (comments)  
 headache Current Immunizations  Reviewed on 2/13/2017 Name Date Influenza Vaccine 10/2/2013 Influenza Vaccine PF 10/2/2014 Influenza Vaccine Split 10/18/2012, 10/4/2010 Pneumococcal Conjugate (PCV-13) 10/22/2015 TDAP Vaccine 10/4/2010 Not reviewed this visit You Were Diagnosed With   
  
 Codes Comments Bursitis of left shoulder    -  Primary ICD-10-CM: M75.52 
ICD-9-CM: 726.10 Anemia, unspecified type     ICD-10-CM: D64.9 ICD-9-CM: 285.9 DM type 2, controlled, with complication ([facility]     LRD-86-QF: E11.8 ICD-9-CM: 250.90 Elevated ALT measurement     ICD-10-CM: R74.0 ICD-9-CM: 790.4 Essential hypertension     ICD-10-CM: I10 
ICD-9-CM: 401.9 Vitals BP Pulse Temp Resp Height(growth percentile) Weight(growth percentile) 137/86 (BP 1 Location: Left arm, BP Patient Position: Sitting) 77 98.8 °F (37.1 °C) (Oral) 12 5' 5\" (1.651 m) 178 lb (80.7 kg) LMP BMI OB Status Smoking Status 01/01/2002 29.62 kg/m2 Hysterectomy Former Smoker Vitals History BMI and BSA Data Body Mass Index Body Surface Area  
 29.62 kg/m 2 1.92 m 2 Preferred Pharmacy Pharmacy Name Phone [facility] [address] [address] [phone] Your Updated Medication List  
  
   
This list is accurate as of: 10/2/17  5:01 PM.  Always use your most recent med list.  
  
  
  
  
 albuterol 90 mcg/actuation inhaler Commonly known as:  PROAIR HFA Take 2 Puffs by inhalation every four (4) hours as needed for Wheezing or Shortness of Breath. ALPRAZolam 0.5 mg tablet Commonly known as:  [place] Take 1 Tab by mouth three (3) times daily as needed for Sleep or Anxiety. buPROPion  mg tablet Commonly known as:  WELLBUTRIN XL  
TAKE ONE TABLET BY MOUTH EVERY MORNING  
  
 CRESTOR 40 mg tablet Generic drug:  rosuvastatin TAKE ONE TABLET BY MOUTH EVERY NIGHT AT BEDTIME FOR CHOLESTEROL  
  
 dapsone 25 mg tablet Take 2 Tabs by mouth daily. diclofenac 1 % Gel Commonly known as:  VOLTAREN Apply 2 g to affected area four (4) times daily. folic acid 1 mg tablet Commonly known as:  FOLVITE  
1 mg. 5 tablets daily  
  
 glucose blood VI test strips strip Commonly known as:  ONETOUCH ULTRA TEST  
USE TO CHECK BLOOD SUGAR THREE TIMES A DAY BEFORE MEALS  
  
 lisinopril 10 mg tablet Commonly known as:  PRINIVIL, ZESTRIL  
TAKE ONE TABLET BY MOUTH DAILY Prescriptions Sent to Pharmacy Refills  
 diclofenac (VOLTAREN) 1 % gel 2 Sig: Apply 2 g to affected area four (4) times daily. Class: Normal  
 Pharmacy: [facility] [address] [place] [address], [address] Ph #: [phone] Route: Topical  
  
We Performed the Following CBC WITH AUTOMATED DIFF [88415 CPT(R)] CK [id number] CPT(R)] HEMOGLOBIN A1C WITH EAG [77263 CPT(R)] METABOLIC PANEL, COMPREHENSIVE [37199 CPT(R)] Introducing [facility]! Dear Krzysztof [place]: Thank you for requesting a Virtual Call Center account. Our records indicate that you already have an active Virtual Call Center account.   You can access your account anytime at [email or web address] Did you know that you can access your hospital and ER discharge instructions at any time in s0cket? You can also review all of your test results from your hospital stay or ER visit. Additional Information If you have questions, please visit the Frequently Asked Questions section of the [email or web address] website at [email or web address] Remember, s0cket is NOT to be used for urgent needs. For medical emergencies, dial 911. Now available from your iPhone and Android! Please provide this summary of care documentation to your next provider. Your primary care clinician is listed as Valentín Jacinto. If you have any questions after today's visit, please call [phone].

## 2017-10-03 NOTE — PROGRESS NOTES
HISTORY OF PRESENT ILLNESS    Chief Complaint   Patient presents with    Hypertension       Presents for follow-up    She is doing well. Shoulder Pain  Patient complains of left side shoulder pain. The symptoms began several weeks ago Course of symptoms since onset has been well controlled. Pain is described as location: shoulder and lateral bursa. Symptoms were incited by no known event. Patient denies N/A. Therapy to date includes none. Diabetes Mellitus follow-up  Last hemoglobin a1c   Lab Results   Component Value Date/Time    Hemoglobin A1c 6.6 11/02/2015 04:57 PM    Hemoglobin A1c (POC) 4.5 02/13/2017 03:20 PM    Hemoglobin A1c, External 6.6 04/20/2015   medication compliance: compliant all of the time. Diabetic diet compliance: compliant most of the time. Home glucose monitoring: fasting values range none. Hypoglycemic episodes:  None. Further diabetic ROS: no polyuria or polydipsia, no chest pain, dyspnea or TIA's, no numbness, tingling or pain in extremities. Hypertension  Hypertension ROS: taking medications as instructed, no medication side effects noted, no TIA's, no chest pain on exertion, no dyspnea on exertion, no swelling of ankles     reports that she quit smoking about 9 years ago. Her smoking use included Cigarettes. She has a 5.00 pack-year smoking history. She has never used smokeless tobacco.    reports that she does not drink alcohol. BP Readings from Last 2 Encounters:   10/02/17 137/86   05/17/17 111/68       Review of Systems   All other systems reviewed and are negative, except as noted in HPI    Past Medical and Surgical History   has a past medical history of Arthralgia of left ankle; Cardiac murmur; Depression, major; EBA (epidermolysis bullosa acquisita) ([facility] (10/8/15); Eczema (5/24/2013); HTN (hypertension); Hyperlipidemia LDL goal < 130; Iliotibial band syndrome; Migraine; Sickle cell trait ([address]  Steroid-induced diabetes mellitus ([facility] (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 has a past surgical history that includes colonoscopy (09/26/2011) and hina and bso (2002). reports that she quit smoking about 9 years ago. Her smoking use included Cigarettes. She has a 5.00 pack-year smoking history. She has never used smokeless tobacco. She reports that she does not drink alcohol or use illicit drugs. family history includes Heart Disease in an other family member; Hypertension in her mother; Stroke in her father. Physical Exam   Nursing note and vitals reviewed. Blood pressure 137/86, pulse 77, temperature 98.8 °F (37.1 °C), temperature source Oral, resp. rate 12, height 5' 5\" (1.651 m), weight 178 lb (80.7 kg), last menstrual period 01/01/2002. Constitutional:  No distress. Eyes: Conjunctivae are normal.   Ears:  Hearing grossly intact  Cardiovascular: Normal rate. regular rhythm, no murmurs or gallops  No edema  Pulmonary/Chest: Effort normal.   CTAB  Musculoskeletal: moves all 4 extremities   Neurological: Alert and oriented to person, place, and time. Skin: No rash noted. Psychiatric: Normal mood and affect. Behavior is normal.     ASSESSMENT and PLAN  Diagnoses and all orders for this visit:    1. Bursitis of left shoulder - trial of voltaren gel. Stretching exercises demonstrated for for this condition. Consider injection  -     diclofenac (VOLTAREN) 1 % gel; Apply 2 g to affected area four (4) times daily. 2. Anemia, unspecified type - on lower dose dapsone  -     CBC WITH AUTOMATED DIFF    3. DM type 2, controlled, with complication (HCC) - Controlled on current regimen. Not taking metformin now since controlled   -     HEMOGLOBIN A1C WITH EAG    4. Elevated ALT measurement  -     METABOLIC PANEL, COMPREHENSIVE  -     CK    5. Essential hypertension - Controlled on current regimen. Continue current medications as written in chart.  -     METABOLIC PANEL, COMPREHENSIVE    6.  Encounter for immunization  -     Influenza virus vaccine (QUADRIVALENT PRES FREE SYRINGE) IM ([id number])  -     AL IMMUNIZ ADMIN,1 SINGLE/COMB VAC/TOXOID    7. [facility]olysis bullosa acquisita) [facility] - Currently asymptomatic  -     dapsone 25 mg tablet; Take 2 Tabs by mouth daily. lab results and schedule of future lab studies reviewed with patient  reviewed medications and side effects in detail  Return to clinic for further evaluation if new symptoms develop      Current Outpatient Prescriptions   Medication Sig    dapsone 25 mg tablet Take 2 Tabs by mouth daily.  diclofenac (VOLTAREN) 1 % gel Apply 2 g to affected area four (4) times daily.  lisinopril (PRINIVIL, ZESTRIL) 10 mg tablet TAKE ONE TABLET BY MOUTH DAILY    buPROPion XL (WELLBUTRIN XL) 150 mg tablet TAKE ONE TABLET BY MOUTH EVERY MORNING    ALPRAZolam (XANAX) 0.5 mg tablet Take 1 Tab by mouth three (3) times daily as needed for Sleep or Anxiety.  CRESTOR 40 mg tablet TAKE ONE TABLET BY MOUTH EVERY NIGHT AT BEDTIME FOR CHOLESTEROL    folic acid (FOLVITE) 1 mg tablet 1 mg. 5 tablets daily    glucose blood VI test strips (ONETOUCH ULTRA TEST) strip USE TO CHECK BLOOD SUGAR THREE TIMES A DAY BEFORE MEALS    albuterol (PROAIR HFA) 90 mcg/actuation inhaler Take 2 Puffs by inhalation every four (4) hours as needed for Wheezing or Shortness of Breath. No current facility-administered medications for this visit.

## 2018-01-03 DIAGNOSIS — E78.5 HYPERLIPIDEMIA WITH TARGET LDL LESS THAN 130: ICD-10-CM

## 2018-01-03 RX ORDER — ROSUVASTATIN CALCIUM 40 MG/1
TABLET, FILM COATED ORAL
Qty: 30 TAB | Refills: 2 | Status: SHIPPED | OUTPATIENT
Start: 2018-01-03 | End: 2018-04-18 | Stop reason: SDUPTHER

## 2018-04-18 DIAGNOSIS — E78.5 HYPERLIPIDEMIA WITH TARGET LDL LESS THAN 130: ICD-10-CM

## 2018-04-18 RX ORDER — ROSUVASTATIN CALCIUM 40 MG/1
40 TABLET, FILM COATED ORAL
Qty: 30 TAB | Refills: 0 | Status: SHIPPED | OUTPATIENT
Start: 2018-04-18 | End: 2018-05-22 | Stop reason: SDUPTHER

## 2018-05-22 DIAGNOSIS — E78.5 HYPERLIPIDEMIA WITH TARGET LDL LESS THAN 130: ICD-10-CM

## 2018-05-22 RX ORDER — ROSUVASTATIN CALCIUM 40 MG/1
40 TABLET, FILM COATED ORAL
Qty: 30 TAB | Refills: 0 | Status: SHIPPED | OUTPATIENT
Start: 2018-05-22 | End: 2018-06-23 | Stop reason: SDUPTHER

## 2018-07-31 ENCOUNTER — OFFICE VISIT (OUTPATIENT)
Dept: INTERNAL MEDICINE CLINIC | Age: 63
End: 2018-07-31

## 2018-07-31 VITALS
WEIGHT: 165 LBS | SYSTOLIC BLOOD PRESSURE: 115 MMHG | HEIGHT: 65 IN | DIASTOLIC BLOOD PRESSURE: 79 MMHG | TEMPERATURE: 99.2 F | HEART RATE: 75 BPM | BODY MASS INDEX: 27.49 KG/M2 | RESPIRATION RATE: 18 BRPM | OXYGEN SATURATION: 96 %

## 2018-07-31 DIAGNOSIS — L12.30 EBA (EPIDERMOLYSIS BULLOSA ACQUISITA) (HCC): ICD-10-CM

## 2018-07-31 DIAGNOSIS — E11.8 DM TYPE 2, CONTROLLED, WITH COMPLICATION (HCC): Primary | ICD-10-CM

## 2018-07-31 DIAGNOSIS — E78.5 HYPERLIPIDEMIA WITH TARGET LDL LESS THAN 130: ICD-10-CM

## 2018-07-31 DIAGNOSIS — E61.1 IRON DEFICIENCY: ICD-10-CM

## 2018-07-31 DIAGNOSIS — T38.0X5A STEROID-INDUCED DIABETES MELLITUS (HCC): ICD-10-CM

## 2018-07-31 DIAGNOSIS — I10 ESSENTIAL HYPERTENSION: ICD-10-CM

## 2018-07-31 DIAGNOSIS — E09.9 STEROID-INDUCED DIABETES MELLITUS (HCC): ICD-10-CM

## 2018-07-31 DIAGNOSIS — Z80.3 FH: BREAST CANCER IN FIRST DEGREE RELATIVE: ICD-10-CM

## 2018-07-31 DIAGNOSIS — Z12.31 SCREENING MAMMOGRAM, ENCOUNTER FOR: ICD-10-CM

## 2018-07-31 NOTE — MR AVS SNAPSHOT
[facility] 
 
 
 [address] 
[phone] Patient: Kezia Huynh MRN: [id number] Visit Information Date & Time Provider Department Dept. Phone Encounter #  
 7/31/2018  8:30 AM Emy Houston MD [facility] [address] [facility] [id number] Upcoming Health Maintenance Date Due  
 EYE EXAM RETINAL OR DILATED Q1 8/25/2016 HEMOGLOBIN A1C Q6M 8/13/2017 MICROALBUMIN Q1 2/13/2018 LIPID PANEL Q1 2/13/2018 Influenza Age 5 to Adult 8/1/2018 BREAST CANCER SCRN MAMMOGRAM 3/23/2019 FOOT EXAM Q1 7/31/2019 DTaP/Tdap/Td series (2 - Td) 10/4/2020 COLONOSCOPY 9/26/2021 Allergies as of 7/31/2018  Review Complete On: 7/31/2018 By: Emy Houston MD  
  
 Severity Noted Reaction Type Reactions Azathioprine  10/25/2015    Other (comments) Elevated Cr and elevated LFTs Methotrexate  01/13/2015    Other (comments)  
 headache Current Immunizations  Reviewed on 2/13/2017 Name Date Influenza Vaccine 10/2/2013 Influenza Vaccine (Quad) PF 10/2/2017 Influenza Vaccine PF 10/2/2014 Influenza Vaccine Split 10/18/2012, 10/4/2010 Pneumococcal Conjugate (PCV-13) 10/22/2015 TDAP Vaccine 10/4/2010 Not reviewed this visit You Were Diagnosed With   
  
 Codes Comments DM type 2, controlled, with complication ([facility]    -  Primary ICD-10-CM: E11.8 ICD-9-CM: 250.90 Steroid-induced diabetes mellitus (HCC)     ICD-10-CM: E09.9, T38.0X5A 
ICD-9-CM: 249.00, E980.4 Essential hypertension     ICD-10-CM: I10 
ICD-9-CM: 401.9 Hyperlipidemia with target LDL less than 130     ICD-10-CM: E78.5 ICD-9-CM: 272.4 EBA (epidermolysis bullosa acquisita) [facility]     ICD-10-CM: L12.30 ICD-9-CM: 694.8 Iron deficiency     ICD-10-CM: E61.1 ICD-9-CM: 280.9  Screening mammogram, encounter for     ICD-10-CM: Z12.31 
ICD-9-CM: V76.12   
 FH: breast cancer in first degree relative     ICD-10-CM: Z80.3 ICD-9-CM: V16.3 Vitals BP Pulse Temp Resp Height(growth percentile) Weight(growth percentile) 115/79 (BP 1 Location: Left arm, BP Patient Position: Sitting) 75 99.2 °F (37.3 °C) (Oral) 18 5' 5\" (1.651 m) 165 lb (74.8 kg) LMP SpO2 BMI OB Status Smoking Status 01/01/2002 96% 27.46 kg/m2 Hysterectomy Former Smoker Vitals History BMI and BSA Data Body Mass Index Body Surface Area  
 27.46 kg/m 2 1.85 m 2 Preferred Pharmacy Pharmacy Name Phone [facility], [address] [place] [address] [phone] Your Updated Medication List  
  
   
This list is accurate as of 7/31/18  9:09 AM.  Always use your most recent med list.  
  
  
  
  
 albuterol 90 mcg/actuation inhaler Commonly known as:  PROAIR HFA Take 2 Puffs by inhalation every four (4) hours as needed for Wheezing or Shortness of Breath. ALPRAZolam 0.5 mg tablet Commonly known as:  [place] Take 1 Tab by mouth three (3) times daily as needed for Sleep or Anxiety. buPROPion  mg tablet Commonly known as:  WELLBUTRIN XL  
TAKE ONE TABLET BY MOUTH EVERY MORNING  
  
 CRESTOR 40 mg tablet Generic drug:  rosuvastatin TAKE ONE TABLET BY MOUTH EVERY NIGHT AT BEDTIME  
  
 dapsone 25 mg tablet Take 2 Tabs by mouth daily. diclofenac 1 % Gel Commonly known as:  VOLTAREN Apply 2 g to affected area four (4) times daily. folic acid 1 mg tablet Commonly known as:  FOLVITE  
1 mg. 5 tablets daily  
  
 glucose blood VI test strips strip Commonly known as:  ONETOUCH ULTRA TEST  
USE TO CHECK BLOOD SUGAR THREE TIMES A DAY BEFORE MEALS  
  
 lisinopril 10 mg tablet Commonly known as:  PRINIVIL, ZESTRIL  
TAKE ONE TABLET BY MOUTH DAILY We Performed the Following CBC W/O DIFF [27542 CPT(R)] HEMOGLOBIN A1C WITH EAG [28742 CPT(R)] IRON PROFILE [id number] CPT(R)] LIPID PANEL [46594 CPT(R)] METABOLIC PANEL, COMPREHENSIVE [62454 CPT(R)] MICROALBUMIN, UR, RAND W/ MICROALB/CREAT RATIO [id number] CPT(R)] To-Do List   
 Around 07/31/2018 Imaging:  [facility] MAMMO BI SCREENING INCL CAD Introducing [facility] [place] [facility]! Dear [place]: Thank you for requesting a [employer] account. Our records indicate that you already have an active [employer] account. You can access your account anytime at [email or web address] Did you know that you can access your hospital and ER discharge instructions at any time in [employer]? You can also review all of your test results from your hospital stay or ER visit. Additional Information If you have questions, please visit the Frequently Asked Questions section of the [employer] website at [email or web address] Remember, [employer] is NOT to be used for urgent needs. For medical emergencies, dial 911. Now available from your iPhone and Android! Please provide this summary of care documentation to your next provider. Your primary care clinician is listed as Jose Guadalupe Damon. If you have any questions after today's visit, please call [phone].

## 2018-07-31 NOTE — PROGRESS NOTES
HISTORY OF PRESENT ILLNESS Chief Complaint Patient presents with  Blood Pressure Check Presents for follow-up Reports mild mid-abdominal pains and some cramps for 2 days. She is fasting. Improves with eating. Seeing Dr. Sanam Trejo in derm. Labs in June, not scanned Lost weight w weight watchers. Not on program now. Wt Readings from Last 3 Encounters:  
07/31/18 165 lb (74.8 kg) 10/02/17 178 lb (80.7 kg) 05/17/17 173 lb (78.5 kg) Diabetes Mellitus follow-up Last hemoglobin a1c Lab Results Component Value Date/Time Hemoglobin A1c 6.6 (H) 11/02/2015 04:57 PM  
 Hemoglobin A1c (POC) 4.5 02/13/2017 03:20 PM  
 Hemoglobin A1c, External 6.6 04/20/2015  
medication compliance: compliant all of the time. Diabetic diet compliance: compliant most of the time. Home glucose monitoring: fasting values range none. Hypoglycemic episodes:  None. Further diabetic ROS: no polyuria or polydipsia, no chest pain, dyspnea or TIA's, no numbness, tingling or pain in extremities. Hypertension Hypertension ROS: taking medications as instructed, no medication side effects noted, no TIA's, no chest pain on exertion, no dyspnea on exertion, no swelling of ankles     reports that she quit smoking about 10 years ago. Her smoking use included Cigarettes. She has a 5.00 pack-year smoking history. She has never used smokeless tobacco.    reports that she does not drink alcohol. BP Readings from Last 2 Encounters:  
07/31/18 115/79  
10/02/17 137/86 Review of Systems All other systems reviewed and are negative, except as noted in HPI Past Medical and Surgical History 
 has a past medical history of Arthralgia of left ankle; Cardiac murmur; Depression, major; EBA (epidermolysis bullosa acquisita) ([facility] [address] (10/8/15); Eczema (5/24/2013); HTN (hypertension); Hyperlipidemia LDL goal < 130; Iliotibial band syndrome; Migraine; Sickle cell trait ([address]  Steroid-induced diabetes mellitus ([facility] [address] (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 has a past surgical history that includes colonoscopy (09/26/2011) and hx hina and bso (2002). reports that she quit smoking about 10 years ago. Her smoking use included Cigarettes. She has a 5.00 pack-year smoking history. She has never used smokeless tobacco. She reports that she does not drink alcohol or use illicit drugs. family history includes Heart Disease in an other family member; Hypertension in her mother; Stroke in her father. Physical Exam  
Nursing note and vitals reviewed. Blood pressure 115/79, pulse 75, temperature 99.2 °F (37.3 °C), temperature source Oral, resp. rate 18, height 5' 5\" (1.651 m), weight 165 lb (74.8 kg), last menstrual period 01/01/2002, SpO2 96 %. Constitutional:  No distress. Eyes: Conjunctivae are normal.  
Ears:  Hearing grossly intact Cardiovascular: Normal rate. regular rhythm, no murmurs or gallops No edema Abd - soft, mild lower abd tenderness Pulmonary/Chest: Effort normal.   CTAB Musculoskeletal: moves all 4 extremities Neurological: Alert and oriented to person, place, and time. Skin: No rash noted. Foot exam  - skin intact, mild dryness w no fissures, no rashes, no significant ulcers or callous formation. Sensation intact by microfilament or light touch Diagnoses and all orders for this visit: 1. DM type 2, controlled, with complication ([facility] -     METABOLIC PANEL, COMPREHENSIVE 
-     HEMOGLOBIN A1C WITH EAG 
-     MICROALBUMIN, UR, RAND W/ MICROALB/CREAT RATIO 2. Steroid-induced diabetes mellitus ([facility] [address] Controlled on current regimen. Continue diet Taking no meds, no steroids 3. Essential hypertension- Controlled on current regimen. Continue current medications as written in chart. 
-     METABOLIC PANEL, COMPREHENSIVE 
-     CBC W/O DIFF 4. Hyperlipidemia with target LDL less than 130- Controlled on current regimen.   Continue current medications as written in chart -     LIPID PANEL 5. EBA (epidermolysis bullosa acquisita) ([facility] [address] Controlled on current regimen. Continue current medications as written in chart 6. Iron deficiency Anemia, maybe from Dapsone 
-     CBC W/O DIFF 
-     IRON PROFILE 7. Breast screening 
daughter w cancer Mammogram ordered Neg genetic testing of daughter 
 
lab results and schedule of future lab studies reviewed with patient 
reviewed medications and side effects in detail Return to clinic for further evaluation if new symptoms develop Current Outpatient Prescriptions Medication Sig  CRESTOR 40 mg tablet TAKE ONE TABLET BY MOUTH EVERY NIGHT AT BEDTIME  buPROPion XL (WELLBUTRIN XL) 150 mg tablet TAKE ONE TABLET BY MOUTH EVERY MORNING  
 lisinopril (PRINIVIL, ZESTRIL) 10 mg tablet TAKE ONE TABLET BY MOUTH DAILY  dapsone 25 mg tablet Take 2 Tabs by mouth daily.  diclofenac (VOLTAREN) 1 % gel Apply 2 g to affected area four (4) times daily.  ALPRAZolam (XANAX) 0.5 mg tablet Take 1 Tab by mouth three (3) times daily as needed for Sleep or Anxiety.  folic acid (FOLVITE) 1 mg tablet 1 mg. 5 tablets daily  glucose blood VI test strips (ONETOUCH ULTRA TEST) strip USE TO CHECK BLOOD SUGAR THREE TIMES A DAY BEFORE MEALS  
 albuterol (PROAIR HFA) 90 mcg/actuation inhaler Take 2 Puffs by inhalation every four (4) hours as needed for Wheezing or Shortness of Breath. No current facility-administered medications for this visit.

## 2018-08-01 LAB
ALBUMIN SERPL-MCNC: 4.7 G/DL (ref 3.6–4.8)
ALBUMIN/CREAT UR: 112.5 MG/G CREAT (ref 0–30)
ALBUMIN/GLOB SERPL: 1.7 {RATIO} (ref 1.2–2.2)
ALP SERPL-CCNC: 93 IU/L (ref 39–117)
ALT SERPL-CCNC: 16 IU/L (ref 0–32)
AST SERPL-CCNC: 18 IU/L (ref 0–40)
BILIRUB SERPL-MCNC: 0.8 MG/DL (ref 0–1.2)
BUN SERPL-MCNC: 12 MG/DL (ref 8–27)
BUN/CREAT SERPL: 11 (ref 12–28)
CALCIUM SERPL-MCNC: 10 MG/DL (ref 8.7–10.3)
CHLORIDE SERPL-SCNC: 100 MMOL/L (ref 96–106)
CHOLEST SERPL-MCNC: 206 MG/DL (ref 100–199)
CO2 SERPL-SCNC: 21 MMOL/L (ref 20–29)
CREAT SERPL-MCNC: 1.13 MG/DL (ref 0.57–1)
CREAT UR-MCNC: 159 MG/DL
ERYTHROCYTE [DISTWIDTH] IN BLOOD BY AUTOMATED COUNT: 16.2 % (ref 12.3–15.4)
EST. AVERAGE GLUCOSE BLD GHB EST-MCNC: 91 MG/DL
GLOBULIN SER CALC-MCNC: 2.8 G/DL (ref 1.5–4.5)
GLUCOSE SERPL-MCNC: 77 MG/DL (ref 65–99)
HBA1C MFR BLD: 4.8 % (ref 4.8–5.6)
HCT VFR BLD AUTO: 36.2 % (ref 34–46.6)
HDLC SERPL-MCNC: 59 MG/DL
HGB BLD-MCNC: 11.7 G/DL (ref 11.1–15.9)
INTERPRETATION, 910389: NORMAL
INTERPRETATION: NORMAL
IRON SATN MFR SERPL: 8 % (ref 15–55)
IRON SERPL-MCNC: 24 UG/DL (ref 27–139)
LDLC SERPL CALC-MCNC: 130 MG/DL (ref 0–99)
Lab: NORMAL
MCH RBC QN AUTO: 30.8 PG (ref 26.6–33)
MCHC RBC AUTO-ENTMCNC: 32.3 G/DL (ref 31.5–35.7)
MCV RBC AUTO: 95 FL (ref 79–97)
MICROALBUMIN UR-MCNC: 178.9 UG/ML
PDF IMAGE, 910387: NORMAL
PLATELET # BLD AUTO: 182 X10E3/UL (ref 150–379)
POTASSIUM SERPL-SCNC: 4 MMOL/L (ref 3.5–5.2)
PROT SERPL-MCNC: 7.5 G/DL (ref 6–8.5)
RBC # BLD AUTO: 3.8 X10E6/UL (ref 3.77–5.28)
SODIUM SERPL-SCNC: 142 MMOL/L (ref 134–144)
TIBC SERPL-MCNC: 306 UG/DL (ref 250–450)
TRIGL SERPL-MCNC: 87 MG/DL (ref 0–149)
UIBC SERPL-MCNC: 282 UG/DL (ref 118–369)
VLDLC SERPL CALC-MCNC: 17 MG/DL (ref 5–40)
WBC # BLD AUTO: 12.4 X10E3/UL (ref 3.4–10.8)

## 2018-08-07 ENCOUNTER — HOSPITAL ENCOUNTER (OUTPATIENT)
Dept: MAMMOGRAPHY | Age: 63
Discharge: HOME | End: 2018-08-07
Attending: INTERNAL MEDICINE
Payer: COMMERCIAL

## 2018-08-07 DIAGNOSIS — Z12.31 SCREENING MAMMOGRAM, ENCOUNTER FOR: ICD-10-CM

## 2018-08-07 PROCEDURE — 77067 SCR MAMMO BI INCL CAD: CPT

## 2019-02-26 DIAGNOSIS — E78.5 HYPERLIPIDEMIA WITH TARGET LDL LESS THAN 130: ICD-10-CM

## 2019-02-27 RX ORDER — ROSUVASTATIN CALCIUM 40 MG/1
TABLET, FILM COATED ORAL
Qty: 30 TAB | Refills: 2 | Status: SHIPPED | OUTPATIENT
Start: 2019-02-27 | End: 2019-02-28 | Stop reason: SDUPTHER

## 2019-02-28 ENCOUNTER — TELEPHONE (OUTPATIENT)
Dept: INTERNAL MEDICINE CLINIC | Age: 64
End: 2019-02-28

## 2019-02-28 DIAGNOSIS — E78.5 HYPERLIPIDEMIA WITH TARGET LDL LESS THAN 130: ICD-10-CM

## 2019-02-28 RX ORDER — ROSUVASTATIN CALCIUM 40 MG/1
TABLET, COATED ORAL
Qty: 30 TAB | Refills: 2 | Status: SHIPPED | OUTPATIENT
Start: 2019-02-28 | End: 2019-02-28

## 2019-02-28 RX ORDER — ROSUVASTATIN CALCIUM 40 MG/1
TABLET, COATED ORAL
Qty: 90 TAB | Refills: 1 | Status: SHIPPED | OUTPATIENT
Start: 2019-02-28 | End: 2020-02-27

## 2019-02-28 NOTE — TELEPHONE ENCOUNTER
Patient requesting generic brand for Crestor be sent to [facility] [place] [employer]) as insurance is not covering name brand. Please send request today as she is out of this medication. Thanks.

## 2019-04-20 DIAGNOSIS — F33.41 RECURRENT MAJOR DEPRESSIVE DISORDER, IN PARTIAL REMISSION (HCC): ICD-10-CM

## 2019-04-20 RX ORDER — BUPROPION HYDROCHLORIDE 150 MG/1
TABLET ORAL
Qty: 30 TAB | Refills: 3 | Status: SHIPPED | OUTPATIENT
Start: 2019-04-20 | End: 2019-09-21 | Stop reason: SDUPTHER

## 2019-04-25 ENCOUNTER — TELEPHONE (OUTPATIENT)
Dept: INTERNAL MEDICINE CLINIC | Age: 64
End: 2019-04-25

## 2019-04-25 NOTE — TELEPHONE ENCOUNTER
----- Message from Kristen Canela sent at 4/25/2019  3:58 PM EDT -----  Regarding: Antony Pennington - MD/ telephone  Pt would like to be called back in reference to setting up an emergency appt for her daughter who has cancer. Pt daughter will be a NP.  Pts contact [phone]

## 2019-04-26 NOTE — TELEPHONE ENCOUNTER
I really can not take new patients at this time. I assume she has a cancer doctor? Why is it an emergency?

## 2019-04-29 DIAGNOSIS — E78.5 HYPERLIPIDEMIA WITH TARGET LDL LESS THAN 130: ICD-10-CM

## 2019-04-29 RX ORDER — LISINOPRIL 10 MG/1
10 TABLET ORAL DAILY
Qty: 30 TAB | Refills: 5 | Status: SHIPPED | OUTPATIENT
Start: 2019-04-29 | End: 2019-12-13 | Stop reason: SDUPTHER

## 2019-10-07 ENCOUNTER — OFFICE VISIT (OUTPATIENT)
Dept: INTERNAL MEDICINE CLINIC | Age: 64
End: 2019-10-07

## 2019-10-07 VITALS
DIASTOLIC BLOOD PRESSURE: 74 MMHG | TEMPERATURE: 98.2 F | RESPIRATION RATE: 12 BRPM | SYSTOLIC BLOOD PRESSURE: 114 MMHG | OXYGEN SATURATION: 100 % | HEART RATE: 83 BPM | WEIGHT: 172.4 LBS | BODY MASS INDEX: 28.72 KG/M2 | HEIGHT: 65 IN

## 2019-10-07 DIAGNOSIS — Z23 ENCOUNTER FOR IMMUNIZATION: ICD-10-CM

## 2019-10-07 DIAGNOSIS — I10 ESSENTIAL HYPERTENSION: ICD-10-CM

## 2019-10-07 DIAGNOSIS — E09.9 STEROID-INDUCED DIABETES MELLITUS (HCC): ICD-10-CM

## 2019-10-07 DIAGNOSIS — Z01.818 PREOP GENERAL PHYSICAL EXAM: ICD-10-CM

## 2019-10-07 DIAGNOSIS — Z12.39 SCREENING FOR BREAST CANCER: ICD-10-CM

## 2019-10-07 DIAGNOSIS — H25.9 SENILE CATARACT OF RIGHT EYE, UNSPECIFIED AGE-RELATED CATARACT TYPE: Primary | ICD-10-CM

## 2019-10-07 DIAGNOSIS — T38.0X5A STEROID-INDUCED DIABETES MELLITUS (HCC): ICD-10-CM

## 2019-10-07 DIAGNOSIS — L12.30 EBA (EPIDERMOLYSIS BULLOSA ACQUISITA) (HCC): ICD-10-CM

## 2019-10-07 DIAGNOSIS — E78.5 HYPERLIPIDEMIA WITH TARGET LDL LESS THAN 130: ICD-10-CM

## 2019-10-07 NOTE — PATIENT INSTRUCTIONS
Cataract Surgery: Before Your Surgery What is cataract surgery? Cataracts are cloudy areas in the lens of your eye. Your lens is behind the colored part of your eye (iris). Its job is to focus light onto the back of your eye. In some people, cataracts prevent light from reaching the back of the eye. This can cause vision problems. Cataract surgery helps you see better. It replaces your natural lens, which has become cloudy, with a clear artificial one. There are two types of cataract surgery. Phacoemulsification (say \"rgsn-ae-gy-snl-ujm-xft-ROMAN-shun\") is the most common type. The doctor makes a small cut in your eye. This cut is called an incision. The doctor uses a special ultrasound tool to break your cloudy lens apart. Sometimes a laser is used too. Then he or she removes the small pieces of the lens through the incision. In most cases, the doctor then inserts an artificial lens through the incision. Most people do not need stitches, because the incision is so small. If the doctor is not able to put in an artificial lens, you can wear a contact lens or thick glasses in place of your natural lens. Extracapsular extraction is a less common type of cataract surgery. The doctor makes a larger incision to remove the whole lens at once. After the doctor removes the lens, he or she stitches up the incision. Recovery from this type of surgery takes longer. Before either surgery, the doctor puts numbing drops in your eye. Some doctors use a shot instead. You may also get medicine to make you feel relaxed. You probably will not feel much pain. The surgery takes about 20 to 40 minutes. After surgery, you may have a bandage or shield on your eye. You will probably go home from surgery after 1 hour in the recovery room. Most people see better in 1 to 3 days. You may be able to go back to work or your normal routine in a few days.  It could take 3 to 10 weeks for your eye to completely heal. After your eye heals, you may still need to wear glasses, especially for reading. Follow-up care is a key part of your treatment and safety. Be sure to make and go to all appointments, and call your doctor if you are having problems. It's also a good idea to know your test results and keep a list of the medicines you take. What happens before surgery? 
 Surgery can be stressful. This information will help you understand what you can expect. And it will help you safely prepare for surgery. 
 Preparing for surgery 
  · Understand exactly what surgery is planned, along with the risks, benefits, and other options. · Tell your doctors ALL the medicines, vitamins, supplements, and herbal remedies you take. Some of these can increase the risk of bleeding or interact with anesthesia.  
  · If you take blood thinners, such as warfarin (Coumadin), clopidogrel (Plavix), or aspirin, be sure to talk to your doctor. He or she will tell you if you should stop taking these medicines before your surgery. Make sure that you understand exactly what your doctor wants you to do.  
  · Your doctor will tell you which medicines to take or stop before your surgery. You may need to stop taking certain medicines a week or more before surgery. So talk to your doctor as soon as you can.  
  · If you have an advance directive, let your doctor know. It may include a living will and a durable power of  for health care. Bring a copy to the hospital. If you don't have one, you may want to prepare one. It lets your doctor and loved ones know your health care wishes. Doctors advise that everyone prepare these papers before any type of surgery or procedure. What happens on the day of surgery? · Follow the instructions exactly about when to stop eating and drinking. If you don't, your surgery may be canceled.  If your doctor told you to take your medicines on the day of surgery, take them with only a sip of water.  
  · Take a bath or shower before you come in for your surgery. Do not apply lotions, perfumes, deodorants, or nail polish.  
  · Take off all jewelry and piercings. And take out contact lenses, if you wear them.  
 At the hospital or surgery center · Bring a picture ID.  
  · The area for surgery is often marked to make sure there are no errors.  
  · You will be kept comfortable and safe by your anesthesia provider. The anesthesia may make you sleep. Or it may just numb the area being worked on.  
  · The surgery will take about 20 to 40 minutes. Going home · Be sure you have someone to drive you home. Anesthesia and pain medicine make it unsafe for you to drive.  
  · You will be given more specific instructions about recovering from your surgery. They will cover things like diet, wound care, follow-up care, driving, and getting back to your normal routine.  
  · You may have a bandage or patch over your eye. You may also have a clear shield over your eye. This prevents you from rubbing it. When should you call your doctor? · You have questions or concerns.  
  · You don't understand how to prepare for your surgery.  
  · You become ill before the surgery (such as fever, flu, or a cold).  
  · You need to reschedule or have changed your mind about having the surgery. Where can you learn more? Go to http://мария-fe.info/. Enter K474 in the search box to learn more about \"Cataract Surgery: Before Your Surgery. \" Current as of: May 5, 2019 Content Version: 12.2 © 0405-8500 [employer]. Care instructions adapted under license by [employer] (which disclaims liability or warranty for this information). If you have questions about a medical condition or this instruction, always ask your healthcare professional. [address] any warranty or liability for your use of this information. Influenza (Flu) Vaccine (Inactivated or Recombinant): What You Need to Know Why get vaccinated? Influenza (\"flu\") is a contagious disease that spreads around the [place] every winter, usually between October and May. Flu is caused by influenza viruses and is spread mainly by coughing, sneezing, and close contact. Anyone can get flu. Flu strikes suddenly and can last several days. Symptoms vary by age, but can include: · Fever/chills. · Sore throat. · Muscle aches. · Fatigue. · Cough. · Headache. · Runny or stuffy nose. Flu can also lead to pneumonia and blood infections, and cause diarrhea and seizures in children. If you have a medical condition, such as heart or lung disease, flu can make it worse. Flu is more dangerous for some people. Infants and young children, people 72years of age and older, pregnant women, and people with certain health conditions or a weakened immune system are at greatest risk. Each year thousands of people in the [facility] die from flu, and many more are hospitalized. Flu vaccine can: · Keep you from getting flu. · Make flu less severe if you do get it. · Keep you from spreading flu to your family and other people. Inactivated and recombinant flu vaccines A dose of flu vaccine is recommended every flu season. Children 6 months through 6years of age may need two doses during the same flu season. Everyone else needs only one dose each flu season. Some inactivated flu vaccines contain a very small amount of a mercury-based preservative called thimerosal. Studies have not shown thimerosal in vaccines to be harmful, but flu vaccines that do not contain thimerosal are available. There is no live flu virus in flu shots. They cannot cause the flu. There are many flu viruses, and they are always changing. Each year a new flu vaccine is made to protect against three or four viruses that are likely to cause disease in the upcoming flu season.  But even when the vaccine doesn't exactly match these viruses, it may still provide some protection. Flu vaccine cannot prevent: · Flu that is caused by a virus not covered by the vaccine. · Illnesses that look like flu but are not. Some people should not get this vaccine Tell the person who is giving you the vaccine: · If you have any severe (life-threatening) allergies. If you ever had a life-threatening allergic reaction after a dose of flu vaccine, or have a severe allergy to any part of this vaccine, you may be advised not to get vaccinated. Most, but not all, types of flu vaccine contain a small amount of egg protein. · If you ever had Guillain-Barré syndrome (also called GBS) Some people with a history of GBS should not get this vaccine. This should be discussed with your doctor. · If you are not feeling well. It is usually okay to get flu vaccine when you have a mild illness, but you might be asked to come back when you feel better. Risks of a vaccine reaction With any medicine, including vaccines, there is a chance of reactions. These are usually mild and go away on their own, but serious reactions are also possible. Most people who get a flu shot do not have any problems with it. Minor problems following a flu shot include: · Soreness, redness, or swelling where the shot was given · Hoarseness · Sore, red or itchy eyes · Cough · Fever · Aches · Headache · Itching · Fatigue If these problems occur, they usually begin soon after the shot and last 1 or 2 days. More serious problems following a flu shot can include the following: · There may be a small increased risk of Guillain-Barré Syndrome (GBS) after inactivated flu vaccine. This risk has been estimated at 1 or 2 additional cases per million people vaccinated. This is much lower than the risk of severe complications from flu, which can be prevented by flu vaccine.  
· Onalee Deutscher children who get the flu shot along with pneumococcal vaccine (PCV13) and/or DTaP vaccine at the same time might be slightly more likely to have a seizure caused by fever. Ask your doctor for more information. Tell your doctor if a child who is getting flu vaccine has ever had a seizure Problems that could happen after any injected vaccine: · People sometimes faint after a medical procedure, including vaccination. Sitting or lying down for about 15 minutes can help prevent fainting, and injuries caused by a fall. Tell your doctor if you feel dizzy, or have vision changes or ringing in the ears. · Some people get severe pain in the shoulder and have difficulty moving the arm where a shot was given. This happens very rarely. · Any medication can cause a severe allergic reaction. Such reactions from a vaccine are very rare, estimated at about 1 in a million doses, and would happen within a few minutes to a few hours after the vaccination. As with any medicine, there is a very remote chance of a vaccine causing a serious injury or death. The safety of vaccines is always being monitored. For more information, visit: www.cdc.gov/vaccinesafety/. What if there is a serious reaction? What should I look for? · Look for anything that concerns you, such as signs of a severe allergic reaction, very high fever, or unusual behavior. Signs of a severe allergic reaction can include hives, swelling of the face and throat, difficulty breathing, a fast heartbeat, dizziness, and weakness  usually within a few minutes to a few hours after the vaccination. What should I do? · If you think it is a severe allergic reaction or other emergency that can't wait, call 9-1-1 and get the person to the nearest hospital. Otherwise, call your doctor. · Reactions should be reported to the \"Vaccine Adverse Event Reporting System\" (VAERS). Your doctor should file this report, or you can do it yourself through the VAERS website at www.vaers. hhs.gov, or by calling 2-261.238.2178. VAERS does not give medical advice. The National Vaccine Injury Compensation Program 
The National Vaccine Injury Compensation Program (VICP) is a federal program that was created to compensate people who may have been injured by certain vaccines. Persons who believe they may have been injured by a vaccine can learn about the program and about filing a claim by calling 9-900.222.4459 or visiting the [address] website at www.[facility].gov/vaccinecompensation. There is a time limit to file a claim for compensation. How can I learn more? · Ask your healthcare provider. He or she can give you the vaccine package insert or suggest other sources of information. · Call your local or state health department. · Contact the Centers for Disease Control and Prevention (CDC): 
? Call 8-934.291.4645 (1-800-CDC-INFO) or 
? Visit CDC's website at www.cdc.gov/flu Vaccine Information Statement Inactivated Influenza Vaccine 8/7/2015) [address] [facility] [id number]-91 [facility] and [employer] Centers for Disease Control and Prevention Many Vaccine Information Statements are available in [place] and other languages. See www.immunize.org/vis. Muchas hojas de información sobre vacunas están disponibles en español y en otros idiomas. Visite www.immunize.org/vis. Care instructions adapted under license by [employer] (which disclaims liability or warranty for this information). If you have questions about a medical condition or this instruction, always ask your healthcare professional. [place] [address] any warranty or liability for your use of this information.

## 2019-10-07 NOTE — PROGRESS NOTES
HISTORY OF PRESENT ILLNESS  Juve Zayas is a 70 y.o. female. HPI  Juve Zayas was referred for evaluation by: Dr. Paige Jasmine' for Pre- Op Evaluation. Please see encounter details and orders for consultative summary. Type of surgery : Removal of cataract and implantation of intraocular lens  Surgery site : Right eye  Anesthesia type: to be determined  Date of procedure: 10/17/2019    Allergies: Allergies   Allergen Reactions    Adhesive Tape-Silicones Other (comments)     Develops blister    Azathioprine Other (comments)     Elevated Cr and elevated LFTs    Methotrexate Other (comments)     headache     Latex allergy: no  Prior reactions to anesthesia:  None    Functional status:  she is able to ambulate up 2 flights of step without shortness of breath, chest pain  Prior cardiac evaluation:  History of cardiac murmur throughout life; stable; denies shortness of breath or CP. Subjective:   Juve Zayas is a 70 y.o. female with hypertension. Hypertension ROS: taking medications as instructed, no medication side effects noted, no TIA's, no chest pain on exertion, no dyspnea on exertion, no swelling of ankles. New concerns: controlled on current regimen. Subjective:   Juve Zayas is a 70 y.o. female with hyperlipidemia. Cardiovascular risk analysis - 70 y.o. female LDL goal is under 100. ROS: taking medications as instructed, no medication side effects noted, no TIA's, no chest pain on exertion, no dyspnea on exertion, no swelling of ankles. Tolerating meds, no myalgias or other side effects noted  New concerns: she is overdue for fasting labs    Reports she does not check her blood sugars and she is not diabetic; was related to steroid usage; last Microalbumin urine showed proteinuria. Reports she has been in remission with her EBA; sees Dermatology at [facility] but has not been back for a year.   She stopped taking Dapsone on her own 3-4 months ago and has not noted any recurrence. Would like Influenza vaccine today. Overdue for annual mammogram.    Current Outpatient Medications   Medication Sig    buPROPion XL (WELLBUTRIN XL) 150 mg tablet TAKE ONE TABLET BY MOUTH EVERY MORNING    lisinopril (PRINIVIL, ZESTRIL) 10 mg tablet Take 1 Tab by mouth daily.  rosuvastatin (CRESTOR) 40 mg tablet TAKE ONE TABLET BY MOUTH EVERY NIGHT AT BEDTIME    ALPRAZolam (XANAX) 0.5 mg tablet Take 1 Tab by mouth three (3) times daily as needed for Sleep or Anxiety.  albuterol (PROAIR HFA) 90 mcg/actuation inhaler Take 2 Puffs by inhalation every four (4) hours as needed for Wheezing or Shortness of Breath.  dapsone 25 mg tablet Take 2 Tabs by mouth daily.  folic acid (FOLVITE) 1 mg tablet 1 mg. 5 tablets daily     No current facility-administered medications for this visit. Past Medical History:   Diagnosis Date    Arthralgia of left ankle     Cardiac murmur     Depression, major     EBA (epidermolysis bullosa acquisita) ([facility] 10/8/15    Dr. Leona Morrissey. rituxan 12/9/15-16.  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 Deborah Kiser  2002     Social History     Tobacco Use    Smoking status: Former Smoker     Packs/day: 0.50     Years: 10.00     Pack years: 5.00     Types: Cigarettes     Last attempt to quit: 3/1/2008     Years since quittin.6    Smokeless tobacco: Never Used   Substance Use Topics    Alcohol use: No     Comment: occasionally    Drug use: No       Blood pressure 114/74, pulse 83, temperature 98.2 °F (36.8 °C), temperature source Oral, resp. rate 12, height 5' 5\" (1.651 m), weight 172 lb 6.4 oz (78.2 kg), last menstrual period 01/01/2002, SpO2 100 %. Review of Systems   Constitutional: Negative for chills and fever. HENT: Negative for congestion and sore throat. Eyes: Positive for blurred vision. Negative for photophobia and pain. Respiratory: Negative for cough and shortness of breath. Cardiovascular: Negative for chest pain, palpitations and leg swelling. Gastrointestinal: Negative for nausea and vomiting. Musculoskeletal: Negative for myalgias. Skin: Negative for itching and rash. Neurological: Negative for dizziness, tingling and headaches. Physical Exam   Constitutional: She is oriented to person, place, and time. She appears well-developed and well-nourished. HENT:   Head: Normocephalic and atraumatic. Right Ear: External ear normal.   Left Ear: External ear normal.   Nose: Nose normal.   Mouth/Throat: Oropharynx is clear and moist.   Eyes: Conjunctivae are normal.   Neck: Normal range of motion. Neck supple. No thyromegaly present. Cardiovascular: Normal rate and regular rhythm. Murmur heard. Pulmonary/Chest: Effort normal and breath sounds normal. She has no wheezes. She has no rales. Abdominal: Soft. Bowel sounds are normal. There is no tenderness. There is no rebound. Musculoskeletal: Normal range of motion. She exhibits no edema. Lymphadenopathy:     She has no cervical adenopathy. Neurological: She is alert and oriented to person, place, and time. Skin: Skin is warm and dry. Psychiatric: Her speech is normal and behavior is normal. Her affect is blunt. Nursing note and vitals reviewed. ASSESSMENT and PLAN  Diagnoses and all orders for this visit:    1. Senile cataract of right eye, unspecified age-related cataract type    2.  Preop general physical exam -- considered medically stable for upcoming low risk Right cataract extraction surgery with implantation of intraocular lens    3. Essential hypertension  -     METABOLIC PANEL, COMPREHENSIVE    4. Hyperlipidemia with target LDL less than 130 -- she will have labs drawn fasting  -     METABOLIC PANEL, COMPREHENSIVE  -     LIPID PANEL    5. Steroid-induced diabetes mellitus (HCC)  -     METABOLIC PANEL, COMPREHENSIVE  -     HEMOGLOBIN A1C WITH EAG  -     MICROALBUMIN, UR, RAND W/ MICROALB/CREAT RATIO    6. EBA (epidermolysis bullosa acquisita) ([facility] - has been stable as per patient; she self-discontinued daily Dapsone  -     CBC WITH AUTOMATED DIFF    7. Screening for breast cancer  -     [facility] MAMMO BI SCREENING INCL CAD; Future    8.  Encounter for immunization  -     INFLUENZA VACCINE INACTIVATED (IIV), SUBUNIT, ADJUVANTED, IM  -     VT IMMUNIZ ADMIN,1 SINGLE/COMB VAC/TOXOID      lab results and schedule of future lab studies reviewed with patient  reviewed diet, exercise and weight control  cardiovascular risk and specific lipid/LDL goals reviewed  reviewed medications and side effects in detail  radiology results and schedule of future radiology studies reviewed with patient

## 2019-12-13 DIAGNOSIS — E78.5 HYPERLIPIDEMIA WITH TARGET LDL LESS THAN 130: ICD-10-CM

## 2019-12-13 RX ORDER — LISINOPRIL 10 MG/1
TABLET ORAL
Qty: 30 TAB | Refills: 0 | Status: SHIPPED | OUTPATIENT
Start: 2019-12-13 | End: 2020-01-17

## 2019-12-20 ENCOUNTER — OFFICE VISIT (OUTPATIENT)
Dept: INTERNAL MEDICINE CLINIC | Age: 64
End: 2019-12-20

## 2019-12-20 VITALS
OXYGEN SATURATION: 97 % | WEIGHT: 176 LBS | BODY MASS INDEX: 29.32 KG/M2 | DIASTOLIC BLOOD PRESSURE: 85 MMHG | RESPIRATION RATE: 18 BRPM | HEIGHT: 65 IN | TEMPERATURE: 98 F | SYSTOLIC BLOOD PRESSURE: 134 MMHG | HEART RATE: 70 BPM

## 2019-12-20 DIAGNOSIS — E11.8 DM TYPE 2, CONTROLLED, WITH COMPLICATION (HCC): ICD-10-CM

## 2019-12-20 DIAGNOSIS — I10 ESSENTIAL HYPERTENSION: ICD-10-CM

## 2019-12-20 DIAGNOSIS — E11.8 DM TYPE 2, CONTROLLED, WITH COMPLICATION (HCC): Primary | ICD-10-CM

## 2019-12-20 DIAGNOSIS — E78.5 HYPERLIPIDEMIA WITH TARGET LDL LESS THAN 130: ICD-10-CM

## 2019-12-20 DIAGNOSIS — F33.41 RECURRENT MAJOR DEPRESSIVE DISORDER, IN PARTIAL REMISSION (HCC): ICD-10-CM

## 2019-12-20 DIAGNOSIS — J01.90 ACUTE NON-RECURRENT SINUSITIS, UNSPECIFIED LOCATION: ICD-10-CM

## 2019-12-20 DIAGNOSIS — Z78.0 ASYMPTOMATIC MENOPAUSAL STATE: ICD-10-CM

## 2019-12-20 RX ORDER — AMOXICILLIN AND CLAVULANATE POTASSIUM 875; 125 MG/1; MG/1
1 TABLET, FILM COATED ORAL 2 TIMES DAILY
Qty: 14 TAB | Refills: 0 | Status: SHIPPED | OUTPATIENT
Start: 2019-12-20 | End: 2019-12-27

## 2019-12-21 LAB
ALBUMIN SERPL-MCNC: 4.7 G/DL (ref 3.6–4.8)
ALBUMIN/CREAT UR: 130.1 MG/G CREAT (ref 0–30)
ALBUMIN/GLOB SERPL: 1.7 {RATIO} (ref 1.2–2.2)
ALP SERPL-CCNC: 86 IU/L (ref 39–117)
ALT SERPL-CCNC: 27 IU/L (ref 0–32)
AST SERPL-CCNC: 30 IU/L (ref 0–40)
BILIRUB SERPL-MCNC: 0.6 MG/DL (ref 0–1.2)
BUN SERPL-MCNC: 10 MG/DL (ref 8–27)
BUN/CREAT SERPL: 10 (ref 12–28)
CALCIUM SERPL-MCNC: 10 MG/DL (ref 8.7–10.3)
CHLORIDE SERPL-SCNC: 100 MMOL/L (ref 96–106)
CHOLEST SERPL-MCNC: 202 MG/DL (ref 100–199)
CO2 SERPL-SCNC: 23 MMOL/L (ref 20–29)
CREAT SERPL-MCNC: 0.97 MG/DL (ref 0.57–1)
CREAT UR-MCNC: 102.5 MG/DL
ERYTHROCYTE [DISTWIDTH] IN BLOOD BY AUTOMATED COUNT: 15.5 % (ref 12.3–15.4)
EST. AVERAGE GLUCOSE BLD GHB EST-MCNC: 126 MG/DL
GLOBULIN SER CALC-MCNC: 2.7 G/DL (ref 1.5–4.5)
GLUCOSE SERPL-MCNC: 78 MG/DL (ref 65–99)
HBA1C MFR BLD: 6 % (ref 4.8–5.6)
HCT VFR BLD AUTO: 38 % (ref 34–46.6)
HDLC SERPL-MCNC: 55 MG/DL
HGB BLD-MCNC: 13 G/DL (ref 11.1–15.9)
INTERPRETATION, 910389: NORMAL
LDLC SERPL CALC-MCNC: 131 MG/DL (ref 0–99)
MCH RBC QN AUTO: 31 PG (ref 26.6–33)
MCHC RBC AUTO-ENTMCNC: 34.2 G/DL (ref 31.5–35.7)
MCV RBC AUTO: 91 FL (ref 79–97)
MICROALBUMIN UR-MCNC: 133.4 UG/ML
PLATELET # BLD AUTO: 175 X10E3/UL (ref 150–450)
POTASSIUM SERPL-SCNC: 4.2 MMOL/L (ref 3.5–5.2)
PROT SERPL-MCNC: 7.4 G/DL (ref 6–8.5)
RBC # BLD AUTO: 4.2 X10E6/UL (ref 3.77–5.28)
SODIUM SERPL-SCNC: 139 MMOL/L (ref 134–144)
TRIGL SERPL-MCNC: 79 MG/DL (ref 0–149)
VLDLC SERPL CALC-MCNC: 16 MG/DL (ref 5–40)
WBC # BLD AUTO: 8.7 X10E3/UL (ref 3.4–10.8)

## 2019-12-23 NOTE — PROGRESS NOTES
HISTORY OF PRESENT ILLNESS    Chief Complaint   Patient presents with    Hypertension    Cholesterol Problem    URI     runny nose, coughing. had a chest cold at [facility], this week it moved up       Presents for follow-up    Doing well. Hypertension  Hypertension ROS: taking medications as instructed, no medication side effects noted, no TIA's, no chest pain on exertion, no dyspnea on exertion, no swelling of ankles     reports that she quit smoking about 11 years ago. Her smoking use included cigarettes. She has a 5.00 pack-year smoking history. She has never used smokeless tobacco.    reports current alcohol use. BP Readings from Last 2 Encounters:   12/20/19 134/85   10/07/19 114/74       Diabetes Mellitus follow-up  Last hemoglobin a1c   Lab Results   Component Value Date/Time    Hemoglobin A1c 6.0 (H) 12/20/2019 12:00 AM    Hemoglobin A1c (POC) 4.5 02/13/2017 03:20 PM    Hemoglobin A1c, External 6.6 04/20/2015     No components found for: POCA1C, HBA1C POC  medication compliance: taking no meds  Diabetic diet compliance: compliant most of the time. Hypoglycemic episodes:  None. Further diabetic ROS: no polyuria or polydipsia, no chest pain, dyspnea or TIA's, no numbness, tingling or pain in extremities. Review of Systems   All other systems reviewed and are negative, except as noted in HPI    Past Medical and Surgical History   has a past medical history of Arthralgia of left ankle, Cardiac murmur, Depression, major, EBA (epidermolysis bullosa acquisita) ([facility] (10/8/15), Eczema (5/24/2013), FH: breast cancer in first degree relative, HTN (hypertension), Hyperlipidemia LDL goal < 130, Iliotibial band syndrome, Migraine, Sickle cell trait ([address] Steroid-induced diabetes mellitus ([address] (8/20/2015), and Ulnar neuropathy of right upper extremity. has a past surgical history that includes colonoscopy (09/26/2011); hx hina and bso (2002); and hx cataract removal (Bilateral, 2019). reports that she quit smoking about 11 years ago. Her smoking use included cigarettes. She has a 5.00 pack-year smoking history. She has never used smokeless tobacco. She reports current alcohol use. She reports that she does not use drugs. family history includes Breast Cancer (age of onset: 45) in her daughter; Heart Disease in an other family member; Hypertension in her mother; No Known Problems in her sister; Stroke in her father. Physical Exam   Nursing note and vitals reviewed. Blood pressure 134/85, pulse 70, temperature 98 °F (36.7 °C), temperature source Oral, resp. rate 18, height 5' 5\" (1.651 m), weight 176 lb (79.8 kg), last menstrual period 01/01/2002, SpO2 97 %. Constitutional:  No distress. Eyes: Conjunctivae are normal.   Ears:  Hearing grossly intact  Cardiovascular: Normal rate. regular rhythm, no murmurs or gallops  No edema  Pulmonary/Chest: Effort normal.   CTAB  Musculoskeletal: moves all 4 extremities   Neurological: Alert and oriented to person, place, and time. Skin: No rash noted. Psychiatric: Normal mood and affect. Behavior is normal.     ASSESSMENT and PLAN  Diagnoses and all orders for this visit:    1. DM type 2, controlled, with complication ([address]  Controlled on current regimen. Continue current medications as written in chart  -     CBC W/O DIFF; Future  -     METABOLIC PANEL, COMPREHENSIVE; Future  -     MICROALBUMIN, UR, RAND W/ MICROALB/CREAT RATIO; Future  -     HEMOGLOBIN A1C WITH EAG; Future  -     LIPID PANEL; Future    2. Acute non-recurrent sinusitis, unspecified location  -     amoxicillin-clavulanate (AUGMENTIN) 875-125 mg per tablet; Take 1 Tab by mouth two (2) times a day for 7 days. 3. Recurrent major depressive disorder, in partial remission ([address]    4. Essential hypertension  Controlled on current regimen. Continue current medications as written in chart.  -     METABOLIC PANEL, COMPREHENSIVE; Future    5.  Hyperlipidemia with target LDL less than 130  Controlled on current regimen. Continue current medications as written in chart.  -     LIPID PANEL; Future    6. Asymptomatic menopausal state  -     DEXA BONE DENSITY STUDY AXIAL; Future        There are no Patient Instructions on file for this visit.    lab results and schedule of future lab studies reviewed with patient  reviewed medications and side effects in detail    Return to clinic for further evaluation if new symptoms develop        Current Outpatient Medications   Medication Sig    amoxicillin-clavulanate (AUGMENTIN) 875-125 mg per tablet Take 1 Tab by mouth two (2) times a day for 7 days.  lisinopril (PRINIVIL, ZESTRIL) 10 mg tablet TAKE ONE TABLET BY MOUTH DAILY    buPROPion XL (WELLBUTRIN XL) 150 mg tablet TAKE ONE TABLET BY MOUTH EVERY MORNING    rosuvastatin (CRESTOR) 40 mg tablet TAKE ONE TABLET BY MOUTH EVERY NIGHT AT BEDTIME    ALPRAZolam (XANAX) 0.5 mg tablet Take 1 Tab by mouth three (3) times daily as needed for Sleep or Anxiety.  albuterol (PROAIR HFA) 90 mcg/actuation inhaler Take 2 Puffs by inhalation every four (4) hours as needed for Wheezing or Shortness of Breath. No current facility-administered medications for this visit.

## 2020-01-17 DIAGNOSIS — E78.5 HYPERLIPIDEMIA WITH TARGET LDL LESS THAN 130: ICD-10-CM

## 2020-01-17 RX ORDER — LISINOPRIL 10 MG/1
TABLET ORAL
Qty: 30 TAB | Refills: 5 | Status: SHIPPED | OUTPATIENT
Start: 2020-01-17 | End: 2020-08-27 | Stop reason: SDUPTHER

## 2020-02-27 DIAGNOSIS — E78.5 HYPERLIPIDEMIA WITH TARGET LDL LESS THAN 130: ICD-10-CM

## 2020-02-27 RX ORDER — ROSUVASTATIN CALCIUM 40 MG/1
TABLET, COATED ORAL
Qty: 90 TAB | Refills: 1 | Status: SHIPPED | OUTPATIENT
Start: 2020-02-27 | End: 2020-08-30

## 2020-04-10 DIAGNOSIS — F33.41 RECURRENT MAJOR DEPRESSIVE DISORDER, IN PARTIAL REMISSION (HCC): ICD-10-CM

## 2020-04-10 RX ORDER — BUPROPION HYDROCHLORIDE 150 MG/1
TABLET ORAL
Qty: 30 TAB | Refills: 3 | Status: SHIPPED | OUTPATIENT
Start: 2020-04-10 | End: 2020-08-27 | Stop reason: SDUPTHER

## 2020-04-10 RX ORDER — BUPROPION HYDROCHLORIDE 150 MG/1
TABLET ORAL
Qty: 30 TAB | Refills: 3 | Status: SHIPPED | OUTPATIENT
Start: 2020-04-10 | End: 2020-04-10 | Stop reason: SDUPTHER

## 2020-08-27 DIAGNOSIS — E78.5 HYPERLIPIDEMIA WITH TARGET LDL LESS THAN 130: ICD-10-CM

## 2020-08-27 DIAGNOSIS — F33.41 RECURRENT MAJOR DEPRESSIVE DISORDER, IN PARTIAL REMISSION (HCC): ICD-10-CM

## 2020-08-27 RX ORDER — BUPROPION HYDROCHLORIDE 150 MG/1
TABLET ORAL
Qty: 30 TAB | Refills: 3 | Status: SHIPPED | OUTPATIENT
Start: 2020-08-27 | End: 2021-01-16 | Stop reason: SDUPTHER

## 2020-08-27 RX ORDER — LISINOPRIL 10 MG/1
10 TABLET ORAL DAILY
Qty: 30 TAB | Refills: 5 | Status: SHIPPED | OUTPATIENT
Start: 2020-08-27 | End: 2021-04-08

## 2020-10-18 DIAGNOSIS — F40.243 FEAR OF FLYING: ICD-10-CM

## 2020-10-19 RX ORDER — ALPRAZOLAM 0.5 MG/1
0.5 TABLET ORAL
Qty: 20 TAB | Refills: 0 | Status: SHIPPED | OUTPATIENT
Start: 2020-10-19 | End: 2022-06-14 | Stop reason: SDUPTHER

## 2020-12-17 ENCOUNTER — PATIENT MESSAGE (OUTPATIENT)
Dept: INTERNAL MEDICINE CLINIC | Age: 65
End: 2020-12-17

## 2021-03-03 DIAGNOSIS — E78.5 HYPERLIPIDEMIA WITH TARGET LDL LESS THAN 130: ICD-10-CM

## 2021-03-04 RX ORDER — ROSUVASTATIN CALCIUM 40 MG/1
TABLET, COATED ORAL
Qty: 90 TAB | Refills: 1 | Status: SHIPPED | OUTPATIENT
Start: 2021-03-04 | End: 2021-09-26

## 2021-04-08 DIAGNOSIS — E78.5 HYPERLIPIDEMIA WITH TARGET LDL LESS THAN 130: ICD-10-CM

## 2021-04-08 RX ORDER — LISINOPRIL 10 MG/1
TABLET ORAL
Qty: 90 TAB | Refills: 4 | Status: SHIPPED | OUTPATIENT
Start: 2021-04-08 | End: 2021-07-07

## 2021-06-10 DIAGNOSIS — F33.41 RECURRENT MAJOR DEPRESSIVE DISORDER, IN PARTIAL REMISSION (HCC): ICD-10-CM

## 2021-06-10 RX ORDER — BUPROPION HYDROCHLORIDE 150 MG/1
TABLET ORAL
Qty: 30 TABLET | Refills: 2 | OUTPATIENT
Start: 2021-06-10

## 2021-06-14 DIAGNOSIS — F33.41 RECURRENT MAJOR DEPRESSIVE DISORDER, IN PARTIAL REMISSION (HCC): ICD-10-CM

## 2021-06-14 RX ORDER — BUPROPION HYDROCHLORIDE 150 MG/1
TABLET ORAL
Qty: 30 TABLET | Refills: 0 | Status: SHIPPED | OUTPATIENT
Start: 2021-06-14 | End: 2021-07-07

## 2021-06-14 NOTE — TELEPHONE ENCOUNTER
Patient called to get refills on Wellbutrin - set up appointment for 07/07/2021 [patient going out of town for two weeks] but is low on medication and will run out before this.

## 2021-06-15 RX ORDER — BUPROPION HYDROCHLORIDE 150 MG/1
TABLET ORAL
Qty: 30 TABLET | Refills: 2 | OUTPATIENT
Start: 2021-06-15

## 2021-07-07 ENCOUNTER — OFFICE VISIT (OUTPATIENT)
Dept: INTERNAL MEDICINE CLINIC | Age: 66
End: 2021-07-07
Payer: MEDICARE

## 2021-07-07 VITALS
BODY MASS INDEX: 28.29 KG/M2 | RESPIRATION RATE: 14 BRPM | HEART RATE: 71 BPM | SYSTOLIC BLOOD PRESSURE: 148 MMHG | HEIGHT: 65 IN | OXYGEN SATURATION: 98 % | TEMPERATURE: 100.4 F | WEIGHT: 169.8 LBS | DIASTOLIC BLOOD PRESSURE: 88 MMHG

## 2021-07-07 DIAGNOSIS — Z12.31 SCREENING MAMMOGRAM, ENCOUNTER FOR: ICD-10-CM

## 2021-07-07 DIAGNOSIS — F43.21 GRIEF REACTION: ICD-10-CM

## 2021-07-07 DIAGNOSIS — F33.41 RECURRENT MAJOR DEPRESSIVE DISORDER, IN PARTIAL REMISSION (HCC): ICD-10-CM

## 2021-07-07 DIAGNOSIS — Z23 ENCOUNTER FOR IMMUNIZATION: ICD-10-CM

## 2021-07-07 DIAGNOSIS — E11.8 DM TYPE 2, CONTROLLED, WITH COMPLICATION (HCC): Primary | ICD-10-CM

## 2021-07-07 DIAGNOSIS — I10 ESSENTIAL HYPERTENSION: ICD-10-CM

## 2021-07-07 DIAGNOSIS — G89.29 CHRONIC RIGHT SHOULDER PAIN: ICD-10-CM

## 2021-07-07 DIAGNOSIS — M25.511 CHRONIC RIGHT SHOULDER PAIN: ICD-10-CM

## 2021-07-07 DIAGNOSIS — E78.5 HYPERLIPIDEMIA WITH TARGET LDL LESS THAN 130: ICD-10-CM

## 2021-07-07 DIAGNOSIS — Z78.0 ASYMPTOMATIC MENOPAUSAL STATE: ICD-10-CM

## 2021-07-07 DIAGNOSIS — R42 VERTIGO: ICD-10-CM

## 2021-07-07 PROCEDURE — 3046F HEMOGLOBIN A1C LEVEL >9.0%: CPT | Performed by: INTERNAL MEDICINE

## 2021-07-07 PROCEDURE — G8754 DIAS BP LESS 90: HCPCS | Performed by: INTERNAL MEDICINE

## 2021-07-07 PROCEDURE — 3017F COLORECTAL CA SCREEN DOC REV: CPT | Performed by: INTERNAL MEDICINE

## 2021-07-07 PROCEDURE — G8419 CALC BMI OUT NRM PARAM NOF/U: HCPCS | Performed by: INTERNAL MEDICINE

## 2021-07-07 PROCEDURE — 1090F PRES/ABSN URINE INCON ASSESS: CPT | Performed by: INTERNAL MEDICINE

## 2021-07-07 PROCEDURE — G9717 DOC PT DX DEP/BP F/U NT REQ: HCPCS | Performed by: INTERNAL MEDICINE

## 2021-07-07 PROCEDURE — G8536 NO DOC ELDER MAL SCRN: HCPCS | Performed by: INTERNAL MEDICINE

## 2021-07-07 PROCEDURE — 99214 OFFICE O/P EST MOD 30 MIN: CPT | Performed by: INTERNAL MEDICINE

## 2021-07-07 PROCEDURE — 2022F DILAT RTA XM EVC RTNOPTHY: CPT | Performed by: INTERNAL MEDICINE

## 2021-07-07 PROCEDURE — G8753 SYS BP > OR = 140: HCPCS | Performed by: INTERNAL MEDICINE

## 2021-07-07 PROCEDURE — 1101F PT FALLS ASSESS-DOCD LE1/YR: CPT | Performed by: INTERNAL MEDICINE

## 2021-07-07 PROCEDURE — G9899 SCRN MAM PERF RSLTS DOC: HCPCS | Performed by: INTERNAL MEDICINE

## 2021-07-07 PROCEDURE — G8400 PT W/DXA NO RESULTS DOC: HCPCS | Performed by: INTERNAL MEDICINE

## 2021-07-07 PROCEDURE — G8427 DOCREV CUR MEDS BY ELIG CLIN: HCPCS | Performed by: INTERNAL MEDICINE

## 2021-07-07 RX ORDER — BUPROPION HYDROCHLORIDE 300 MG/1
300 TABLET ORAL
Qty: 30 TABLET | Refills: 4 | Status: SHIPPED | OUTPATIENT
Start: 2021-07-07 | End: 2022-01-05 | Stop reason: SDUPTHER

## 2021-07-07 RX ORDER — TETANUS TOXOID, REDUCED DIPHTHERIA TOXOID AND ACELLULAR PERTUSSIS VACCINE, ADSORBED 5; 2.5; 8; 8; 2.5 [IU]/.5ML; [IU]/.5ML; UG/.5ML; UG/.5ML; UG/.5ML
0.5 SUSPENSION INTRAMUSCULAR ONCE
Qty: 1 VIAL | Refills: 0 | Status: SHIPPED | OUTPATIENT
Start: 2021-07-07 | End: 2021-07-07

## 2021-07-07 RX ORDER — DICLOFENAC SODIUM 50 MG/1
50 TABLET, DELAYED RELEASE ORAL 2 TIMES DAILY
Qty: 60 TABLET | Refills: 2 | Status: SHIPPED | OUTPATIENT
Start: 2021-07-07 | End: 2022-03-16 | Stop reason: ALTCHOICE

## 2021-07-07 RX ORDER — ZOSTER VACCINE RECOMBINANT, ADJUVANTED 50 MCG/0.5
0.5 KIT INTRAMUSCULAR ONCE
Qty: 0.5 ML | Refills: 1 | Status: SHIPPED | OUTPATIENT
Start: 2021-07-07 | End: 2021-07-07

## 2021-07-07 RX ORDER — LISINOPRIL 20 MG/1
20 TABLET ORAL DAILY
Qty: 90 TABLET | Refills: 1 | Status: SHIPPED | OUTPATIENT
Start: 2021-07-07 | End: 2022-02-22 | Stop reason: SDUPTHER

## 2021-07-07 NOTE — PROGRESS NOTES
HISTORY OF PRESENT ILLNESS    Chief Complaint   Patient presents with    Medication Evaluation    Hypertension    Diabetes    Cholesterol Problem     Nonfasting.  Shoulder Pain     Both - moreso right shoulder. Presents for follow-up    Sadly, one of her daughters passed away in December from breast cancer. That daughter had no children. Patient continues to grieve. Was referred to counseling but says she is not ready to go. She is taking Wellbutrin 150 mg daily for chronic depression. She says she is not sleeping well. Appetite is poor. Denies any suicidal ideation. She does get some slade from consulting with and seeing with her grandchildren. Reports occasional vertigo. Occurs when she is turning her head to the left. Symptoms are relatively mild but not associate with any significant nausea or vomiting. Does not occur when she is walking or looking around. Right shoulder pain. Pain is primarily on the upper part of her right shoulder. Worse with lifting her hand above her head. Denies any other issues. No weakness. No injury. Diabetes Mellitus follow-up  Last hemoglobin a1c   Lab Results   Component Value Date/Time    Hemoglobin A1c 6.0 (H) 12/20/2019 12:00 AM    Hemoglobin A1c (POC) 4.5 02/13/2017 03:20 PM    Hemoglobin A1c, External 6.6 04/20/2015 12:00 AM   medication compliance: compliant all of the time. Diabetic diet compliance: compliant most of the time. Home glucose monitoring: fasting values range not done. Hypoglycemic episodes:  None. Further diabetic ROS: no polyuria or polydipsia, no chest pain, dyspnea or TIA's, no numbness, tingling or pain in extremities. Hypertension  Hypertension ROS: taking medications as instructed, no medication side effects noted, no TIA's, no chest pain on exertion, no dyspnea on exertion, no swelling of ankles     reports that she quit smoking about 13 years ago. Her smoking use included cigarettes.  She has a 5.00 pack-year smoking history. She has never used smokeless tobacco.    reports current alcohol use. BP Readings from Last 2 Encounters:   07/07/21 (!) 148/88   12/20/19 134/85           Review of Systems   All other systems reviewed and are negative, except as noted in HPI    Past Medical and Surgical History   has a past medical history of Arthralgia of left ankle, Cardiac murmur, Depression, major, EBA (epidermolysis bullosa acquisita) ([facility] (10/8/15), Eczema (5/24/2013), FH: breast cancer in first degree relative, HTN (hypertension), Hyperlipidemia LDL goal < 130, Iliotibial band syndrome, Migraine, Sickle cell trait ([facility] Steroid-induced diabetes mellitus ([facility] (8/20/2015), and Ulnar neuropathy of right upper extremity. has a past surgical history that includes colonoscopy (09/26/2011); hx hina and bso (2002); hx cataract removal (Bilateral, 2019); and hx other surgical (06/2021). reports that she quit smoking about 13 years ago. Her smoking use included cigarettes. She has a 5.00 pack-year smoking history. She has never used smokeless tobacco. She reports current alcohol use. She reports that she does not use drugs. family history includes Breast Cancer (age of onset: 45) in her daughter; Heart Disease in an other family member; Hypertension in her mother; No Known Problems in her sister; Stroke in her father. Physical Exam   Nursing note and vitals reviewed. Blood pressure (!) 148/88, pulse 71, temperature 100.4 °F (38 °C), temperature source Oral, resp. rate 14, height 5' 5\" (1.651 m), weight 169 lb 12.8 oz (77 kg), last menstrual period 01/01/2002, SpO2 98 %. Constitutional:  No distress. Eyes: Conjunctivae are normal.   Ears:  Hearing grossly intact  Cardiovascular: Normal rate. regular rhythm, no murmurs or gallops  No edema  Pulmonary/Chest: Effort normal.   CTAB  Musculoskeletal: moves all 4 extremities   Right AC joint region discomfort.   Full range of motion of shoulder otherwise. Neurological: Alert and oriented to person, place, and time. Skin: No visible rash noted. Psychiatric: Pleasant, blunted though      ASSESSMENT and PLAN  Diagnoses and all orders for this visit:    1. DM type 2, controlled, with complication ([facility]  History of steroid-induced diabetes. Likely controlled on no medication. Low-carb diet. -     MICROALBUMIN, UR, RAND W/ MICROALB/CREAT RATIO; Future  -     METABOLIC PANEL, COMPREHENSIVE; Future  -     HEMOGLOBIN A1C WITH EAG; Future  -     LIPID PANEL; Future    2. Chronic right shoulder pain  Likely AC joint arthritis. Monitor.  -     diclofenac EC (VOLTAREN) 50 mg EC tablet; Take 1 Tablet by mouth two (2) times a day. Indications: shoulder pains    3. Essential hypertension  Blood pressure is borderline elevated in the office today. Increase lisinopril to 20 mg.    4. Hyperlipidemia with target LDL less than 130  Likely well controlled on Crestor. 5. Recurrent major depressive disorder, in partial remission ([facility]  6. Grief reaction  Offered my support. Prone to depression and on Wellbutrin. Increase dose to 300 mg  -     buPROPion XL (WELLBUTRIN XL) 300 mg XL tablet; Take 1 Tablet by mouth every morning. Increased 7/7/21  Indications: major depressive disorder    7. Vertigo  Reassured. Mild vertigo. Consider referral to physical therapy. 8. Screening mammogram, encounter for  -     [facility] MAMMO BI SCREENING INCL CAD; Future    9. Asymptomatic menopausal state  -     DEXA BONE DENSITY STUDY AXIAL; Future    10. Encounter for immunization  -     diphth, pertus,acell,, tetanus (Boostrix Tdap) 2.5-8-5 Lf-mcg-Lf/0.5mL syrg; 0.5 mL by IntraMUSCular route once for 1 dose. Indications: vaccination to prevent diphtheria, pertussis and tetanus  -     Shingrix, PF, 50 mcg/0.5 mL susr injection; 0.5 mL by IntraMUSCular route once for 1 dose. Receive 2nd dose in 2-6 months.   For Shingles (Zoster) prevention    lab results and schedule of future lab studies reviewed with patient  reviewed medications and side effects in detail    Return to clinic for further evaluation if new symptoms develop        Current Outpatient Medications   Medication Sig    buPROPion XL (WELLBUTRIN XL) 300 mg XL tablet Take 1 Tablet by mouth every morning. Increased 7/7/21  Indications: major depressive disorder    diphth, pertus,acell,, tetanus (Boostrix Tdap) 2.5-8-5 Lf-mcg-Lf/0.5mL syrg 0.5 mL by IntraMUSCular route once for 1 dose. Indications: vaccination to prevent diphtheria, pertussis and tetanus    Shingrix, PF, 50 mcg/0.5 mL susr injection 0.5 mL by IntraMUSCular route once for 1 dose. Receive 2nd dose in 2-6 months. For Shingles (Zoster) prevention    lisinopriL (PRINIVIL, ZESTRIL) 10 mg tablet TAKE ONE TABLET BY MOUTH DAILY    rosuvastatin (CRESTOR) 40 mg tablet Take 1 tablet by mouth once daily  Indications: high cholesterol    ALPRAZolam (Xanax) 0.5 mg tablet Take 1 Tab by mouth three (3) times daily as needed for Sleep or Anxiety.  albuterol (PROAIR HFA) 90 mcg/actuation inhaler Take 2 Puffs by inhalation every four (4) hours as needed for Wheezing or Shortness of Breath. No current facility-administered medications for this visit.

## 2021-07-08 LAB
ALBUMIN SERPL-MCNC: 4 G/DL (ref 3.5–5)
ALBUMIN/GLOB SERPL: 1.1 {RATIO} (ref 1.1–2.2)
ALP SERPL-CCNC: 80 U/L (ref 45–117)
ALT SERPL-CCNC: 48 U/L (ref 12–78)
ANION GAP SERPL CALC-SCNC: 3 MMOL/L (ref 5–15)
AST SERPL-CCNC: 60 U/L (ref 15–37)
BILIRUB SERPL-MCNC: 0.6 MG/DL (ref 0.2–1)
BUN SERPL-MCNC: 12 MG/DL (ref 6–20)
BUN/CREAT SERPL: 10 (ref 12–20)
CALCIUM SERPL-MCNC: 10.2 MG/DL (ref 8.5–10.1)
CHLORIDE SERPL-SCNC: 110 MMOL/L (ref 97–108)
CHOLEST SERPL-MCNC: 219 MG/DL
CO2 SERPL-SCNC: 25 MMOL/L (ref 21–32)
CREAT SERPL-MCNC: 1.17 MG/DL (ref 0.55–1.02)
CREAT UR-MCNC: 139 MG/DL
EST. AVERAGE GLUCOSE BLD GHB EST-MCNC: 123 MG/DL
GLOBULIN SER CALC-MCNC: 3.7 G/DL (ref 2–4)
GLUCOSE SERPL-MCNC: 91 MG/DL (ref 65–100)
HBA1C MFR BLD: 5.9 % (ref 4–5.6)
HDLC SERPL-MCNC: 61 MG/DL
HDLC SERPL: 3.6 {RATIO} (ref 0–5)
LDLC SERPL CALC-MCNC: 140 MG/DL (ref 0–100)
MICROALBUMIN UR-MCNC: 9.24 MG/DL
MICROALBUMIN/CREAT UR-RTO: 66 MG/G (ref 0–30)
POTASSIUM SERPL-SCNC: 4.3 MMOL/L (ref 3.5–5.1)
PROT SERPL-MCNC: 7.7 G/DL (ref 6.4–8.2)
SODIUM SERPL-SCNC: 138 MMOL/L (ref 136–145)
TRIGL SERPL-MCNC: 90 MG/DL (ref ?–150)
VLDLC SERPL CALC-MCNC: 18 MG/DL

## 2021-08-19 ENCOUNTER — HOSPITAL ENCOUNTER (OUTPATIENT)
Dept: MAMMOGRAPHY | Age: 66
Discharge: HOME | End: 2021-08-19
Attending: INTERNAL MEDICINE
Payer: MEDICARE

## 2021-08-19 DIAGNOSIS — Z78.0 ASYMPTOMATIC MENOPAUSAL STATE: ICD-10-CM

## 2021-08-19 DIAGNOSIS — Z12.31 SCREENING MAMMOGRAM, ENCOUNTER FOR: ICD-10-CM

## 2021-08-19 PROCEDURE — 77067 SCR MAMMO BI INCL CAD: CPT

## 2021-08-19 PROCEDURE — 77080 DXA BONE DENSITY AXIAL: CPT

## 2021-09-25 DIAGNOSIS — E78.5 HYPERLIPIDEMIA WITH TARGET LDL LESS THAN 130: ICD-10-CM

## 2021-09-26 RX ORDER — ROSUVASTATIN CALCIUM 40 MG/1
TABLET, COATED ORAL
Qty: 90 TABLET | Refills: 1 | Status: SHIPPED | OUTPATIENT
Start: 2021-09-26 | End: 2022-05-11

## 2022-01-05 DIAGNOSIS — F43.21 GRIEF REACTION: ICD-10-CM

## 2022-01-05 DIAGNOSIS — F33.41 RECURRENT MAJOR DEPRESSIVE DISORDER, IN PARTIAL REMISSION (HCC): ICD-10-CM

## 2022-01-05 RX ORDER — BUPROPION HYDROCHLORIDE 300 MG/1
300 TABLET ORAL
Qty: 30 TABLET | Refills: 4 | Status: SHIPPED | OUTPATIENT
Start: 2022-01-05 | End: 2022-03-16

## 2022-02-22 DIAGNOSIS — E78.5 HYPERLIPIDEMIA WITH TARGET LDL LESS THAN 130: ICD-10-CM

## 2022-02-22 RX ORDER — LISINOPRIL 20 MG/1
20 TABLET ORAL DAILY
Qty: 90 TABLET | Refills: 1 | Status: SHIPPED | OUTPATIENT
Start: 2022-02-22 | End: 2022-09-11

## 2022-03-16 ENCOUNTER — OFFICE VISIT (OUTPATIENT)
Dept: INTERNAL MEDICINE CLINIC | Age: 67
End: 2022-03-16
Payer: MEDICARE

## 2022-03-16 VITALS
DIASTOLIC BLOOD PRESSURE: 81 MMHG | SYSTOLIC BLOOD PRESSURE: 121 MMHG | TEMPERATURE: 98.8 F | WEIGHT: 173.4 LBS | RESPIRATION RATE: 15 BRPM | HEIGHT: 65 IN | BODY MASS INDEX: 28.89 KG/M2 | HEART RATE: 78 BPM

## 2022-03-16 DIAGNOSIS — F33.41 RECURRENT MAJOR DEPRESSIVE DISORDER, IN PARTIAL REMISSION (HCC): ICD-10-CM

## 2022-03-16 DIAGNOSIS — Z12.11 SCREEN FOR COLON CANCER: ICD-10-CM

## 2022-03-16 DIAGNOSIS — I10 PRIMARY HYPERTENSION: ICD-10-CM

## 2022-03-16 DIAGNOSIS — F43.21 GRIEF REACTION: ICD-10-CM

## 2022-03-16 DIAGNOSIS — Z00.00 MEDICARE ANNUAL WELLNESS VISIT, SUBSEQUENT: Primary | ICD-10-CM

## 2022-03-16 DIAGNOSIS — E78.5 HYPERLIPIDEMIA WITH TARGET LDL LESS THAN 130: ICD-10-CM

## 2022-03-16 DIAGNOSIS — E11.8 DM TYPE 2, CONTROLLED, WITH COMPLICATION (HCC): ICD-10-CM

## 2022-03-16 PROCEDURE — 99214 OFFICE O/P EST MOD 30 MIN: CPT | Performed by: INTERNAL MEDICINE

## 2022-03-16 PROCEDURE — G0439 PPPS, SUBSEQ VISIT: HCPCS | Performed by: INTERNAL MEDICINE

## 2022-03-16 RX ORDER — BUPROPION HYDROCHLORIDE 300 MG/1
300 TABLET ORAL
Qty: 90 TABLET | Refills: 1 | Status: SHIPPED | OUTPATIENT
Start: 2022-03-16

## 2022-03-17 RX ORDER — MELOXICAM 15 MG/1
15 TABLET ORAL DAILY
Qty: 30 TABLET | Refills: 2 | Status: SHIPPED | OUTPATIENT
Start: 2022-03-17

## 2022-03-17 NOTE — PROGRESS NOTES
This is a Subsequent Medicare Annual Wellness Visit providing Personalized Prevention Plan Services (PPPS) (Performed 12 months after initial AWV and PPPS )    I have reviewed the patient's medical history in detail and updated the computerized patient record. Hypertension  Hypertension ROS: taking medications as instructed, no medication side effects noted, no TIA's, no chest pain on exertion, no dyspnea on exertion, no swelling of ankles     reports that she quit smoking about 14 years ago. Her smoking use included cigarettes. She has a 5.00 pack-year smoking history. She has never used smokeless tobacco.    reports current alcohol use. BP Readings from Last 2 Encounters:   03/16/22 121/81   07/07/21 (!) 148/88     Diabetes Mellitus follow-up  Last hemoglobin a1c   Lab Results   Component Value Date/Time    Hemoglobin A1c 5.9 (H) 07/07/2021 03:04 PM    Hemoglobin A1c (POC) 4.5 02/13/2017 03:20 PM    Hemoglobin A1c, External 6.6 04/20/2015 12:00 AM    medication compliance: compliant all of the time. Diabetic diet compliance: compliant most of the time. Home glucose monitoring: fasting values range none. Hypoglycemic episodes:  None. Further diabetic ROS: no polyuria or polydipsia, no chest pain, dyspnea or TIA's, no numbness, tingling or pain in extremities. Hyperlipidemia  Currently she takes crestor 40 mg  ROS: taking medications as instructed, no medication side effects noted  No new myalgias, no joint pains, no weakness  No TIA's, no chest pain on exertion, no dyspnea on exertion, no swelling of ankles.    Lab Results   Component Value Date/Time    Cholesterol, total 219 (H) 07/07/2021 03:04 PM    HDL Cholesterol 61 07/07/2021 03:04 PM    LDL, calculated 140 (H) 07/07/2021 03:04 PM    VLDL, calculated 18 07/07/2021 03:04 PM    Triglyceride 90 07/07/2021 03:04 PM    CHOL/HDL Ratio 3.6 07/07/2021 03:04 PM     Depression follow-up  Patient overall feels her depression is gradually improving. Lost 44 yo daughter 12/2020 to breast cancer  Current symptoms include depressed mood. Treatment includes Wellbutrin. Patient does not see a counselor. Denies current suicidal and homicidal plan or intent. Side effects from the treatment: none. History     Past Medical History:   Diagnosis Date    Arthralgia of left ankle     Cardiac murmur     Depression, major     EBA (epidermolysis bullosa acquisita) ([facility] [address] 10/8/15    Dr. Shruti Ayers. rituxan 12/9/15-1/5/16. Dapsone 4/8/16    Eczema 5/24/2013    FH: breast cancer in first degree relative     daughter age 45 (normal genetic testing)    HTN (hypertension)     Hyperlipidemia LDL goal < 130     Iliotibial band syndrome     Migraine     Sickle cell trait ([facility] [address]     Steroid-induced diabetes mellitus ([facility] [address] 8/20/2015    Ulnar neuropathy of right upper extremity        Past Surgical History:   Procedure Laterality Date    COLONOSCOPY  09/26/2011    normal, hemorrhoids, Dr. Karlo Ramirez Bilateral 2019    Dr. Pina Landing'    [facility]  06/2021    Removal of scar tissue from eyes    HX SARA AND BSO  2002       Current Outpatient Medications   Medication Sig    buPROPion XL (WELLBUTRIN XL) 300 mg XL tablet Take 1 Tablet by mouth every morning. Increased 7/7/21  Indications: major depressive disorder    lisinopriL (PRINIVIL, ZESTRIL) 20 mg tablet Take 1 Tablet by mouth daily. Increased 7/7/21    rosuvastatin (CRESTOR) 40 mg tablet TAKE ONE TABLET BY MOUTH EVERY NIGHT AT BEDTIME    ALPRAZolam (Xanax) 0.5 mg tablet Take 1 Tab by mouth three (3) times daily as needed for Sleep or Anxiety.  albuterol (PROAIR HFA) 90 mcg/actuation inhaler Take 2 Puffs by inhalation every four (4) hours as needed for Wheezing or Shortness of Breath. No current facility-administered medications for this visit.        Allergies   Allergen Reactions    Adhesive Tape-Silicones Other (comments)     Develops blister    Azathioprine Other (comments)     Elevated Cr and elevated LFTs    Methotrexate Other (comments)     headache    Voltaren [Diclofenac Sodium] Palpitations and Vertigo       Family History   Problem Relation Age of Onset    Hypertension Mother     Stroke Father         aneurysm    Heart Disease Other     Breast Cancer Daughter 45        passed away 12/2020    No Known Problems Sister         reports that she quit smoking about 14 years ago. Her smoking use included cigarettes. She has a 5.00 pack-year smoking history. She has never used smokeless tobacco.   reports current alcohol use. Depression Risk Factor Screening:       Alcohol Risk Factor Screening: On any occasion during the past 3 months, have you had more than 3 drinks containing alcohol? No    Do you average more than 14 drinks per week? No      Functional Ability and Level of Safety:     Hearing Loss   mild    Activities of Daily Living   Self-care. Requires assistance with: no ADLs    Fall Risk     Fall Risk Assessment, last 12 mths 7/7/2021   Able to walk? Yes   Fall in past 12 months? 0   Do you feel unsteady? 0   Are you worried about falling 0   Number of falls in past 12 months -   Fall with injury? -         Abuse Screen   Patient is not abused    Review of Systems   A comprehensive review of systems was negative except for that written in the HPI. Physical Examination     Evaluation of Cognitive Function:  Mood/affect:  neutral, happy  Appearance: age appropriate  Family member/caregiver input: none    Blood pressure 121/81, pulse 78, temperature 98.8 °F (37.1 °C), temperature source Oral, resp. rate 15, height 5' 5\" (1.651 m), weight 173 lb 6.4 oz (78.7 kg), last menstrual period 01/01/2002.   General appearance: alert, cooperative, no distress, appears stated age  Neck: supple, symmetrical, trachea midline, no adenopathy, thyroid: not enlarged, symmetric, no tenderness/mass/nodules, no carotid bruit and no JVD  Lungs: clear to auscultation bilaterally  Heart: regular rate and rhythm, S1, S2 normal, no murmur, click, rub or gallop  Extremities: extremities normal, atraumatic, no cyanosis or edema    Patient Care Team:  Alberto Friedman MD as PCP - [facility], Raquel Whitaker MD as PCP - [address] [place] [address] Dr Aguirre Provider      Advice/Referrals/Counseling   Education and counseling provided. See below for specific orders    Assessment/Plan   Diagnoses and all orders for this visit:    1. Medicare annual wellness visit, subsequent  Recommend Shingrix vaccination, pneumococcal 23, diabetic eye exam, Tdap    2. Screen for colon cancer  Due for screening colonoscopy as of fall 2021.  -     REFERRAL TO GASTROENTEROLOGY    3. DM type 2, controlled, with complication ([facility] [address]  This condition is controlled on current medication regimen as written in medication list.  Medications refilled  -     METABOLIC PANEL, COMPREHENSIVE; Future  -     CBC W/O DIFF; Future  -     HEMOGLOBIN A1C WITH EAG; Future  -     LIPID PANEL; Future    4. Primary hypertension  This condition is controlled on current medication regimen as written in medication list.  Medications refilled. -     METABOLIC PANEL, COMPREHENSIVE; Future    5. Hyperlipidemia with target LDL less than 130  This condition is controlled on current medication regimen as written in medication list.  Medications refilled. -     LIPID PANEL; Future    6. Recurrent major depressive disorder, in partial remission ([facility] [address]  7. Grief reaction  This condition is controlled on current medication regimen as written in medication list.  Medications refilled. -     buPROPion XL (WELLBUTRIN XL) 300 mg XL tablet; Take 1 Tablet by mouth every morning. Increased 7/7/21  Indications: major depressive disorder    . Potential medication side effects were discussed with the patient; let me know if any occur.   Return for yearly Annual Wellness Visits

## 2022-03-31 ENCOUNTER — DOCUMENTATION ONLY (OUTPATIENT)
Dept: INTERNAL MEDICINE CLINIC | Age: 67
End: 2022-03-31

## 2022-03-31 DIAGNOSIS — E11.8 DM TYPE 2, CONTROLLED, WITH COMPLICATION (HCC): ICD-10-CM

## 2022-03-31 DIAGNOSIS — E78.5 HYPERLIPIDEMIA WITH TARGET LDL LESS THAN 130: ICD-10-CM

## 2022-03-31 DIAGNOSIS — I10 PRIMARY HYPERTENSION: ICD-10-CM

## 2022-03-31 NOTE — PROGRESS NOTES
Faxed recent PN to [facility]/Dr. Napoleon Fajardo as per request.  Currently no recent labs available.  Jared Molina LPN

## 2022-04-01 LAB
ALBUMIN SERPL-MCNC: 4 G/DL (ref 3.5–5)
ALBUMIN/GLOB SERPL: 1 {RATIO} (ref 1.1–2.2)
ALP SERPL-CCNC: 88 U/L (ref 45–117)
ALT SERPL-CCNC: 55 U/L (ref 12–78)
ANION GAP SERPL CALC-SCNC: 7 MMOL/L (ref 5–15)
AST SERPL-CCNC: 52 U/L (ref 15–37)
BILIRUB SERPL-MCNC: 0.4 MG/DL (ref 0.2–1)
BUN SERPL-MCNC: 17 MG/DL (ref 6–20)
BUN/CREAT SERPL: 14 (ref 12–20)
CALCIUM SERPL-MCNC: 9.6 MG/DL (ref 8.5–10.1)
CHLORIDE SERPL-SCNC: 108 MMOL/L (ref 97–108)
CHOLEST SERPL-MCNC: 213 MG/DL
CO2 SERPL-SCNC: 23 MMOL/L (ref 21–32)
CREAT SERPL-MCNC: 1.23 MG/DL (ref 0.55–1.02)
ERYTHROCYTE [DISTWIDTH] IN BLOOD BY AUTOMATED COUNT: 15.5 % (ref 11.5–14.5)
EST. AVERAGE GLUCOSE BLD GHB EST-MCNC: 111 MG/DL
GLOBULIN SER CALC-MCNC: 4 G/DL (ref 2–4)
GLUCOSE SERPL-MCNC: 94 MG/DL (ref 65–100)
HBA1C MFR BLD: 5.5 % (ref 4–5.6)
HCT VFR BLD AUTO: 35.7 % (ref 35–47)
HDLC SERPL-MCNC: 54 MG/DL
HDLC SERPL: 3.9 {RATIO} (ref 0–5)
HGB BLD-MCNC: 11.9 G/DL (ref 11.5–16)
LDLC SERPL CALC-MCNC: 141 MG/DL (ref 0–100)
MCH RBC QN AUTO: 32.2 PG (ref 26–34)
MCHC RBC AUTO-ENTMCNC: 33.3 G/DL (ref 30–36.5)
MCV RBC AUTO: 96.5 FL (ref 80–99)
NRBC # BLD: 0 K/UL (ref 0–0.01)
NRBC BLD-RTO: 0 PER 100 WBC
PLATELET # BLD AUTO: 148 K/UL (ref 150–400)
PMV BLD AUTO: 12.2 FL (ref 8.9–12.9)
POTASSIUM SERPL-SCNC: 4.4 MMOL/L (ref 3.5–5.1)
PROT SERPL-MCNC: 8 G/DL (ref 6.4–8.2)
RBC # BLD AUTO: 3.7 M/UL (ref 3.8–5.2)
SODIUM SERPL-SCNC: 138 MMOL/L (ref 136–145)
TRIGL SERPL-MCNC: 90 MG/DL (ref ?–150)
VLDLC SERPL CALC-MCNC: 18 MG/DL
WBC # BLD AUTO: 6.5 K/UL (ref 3.6–11)

## 2022-05-11 DIAGNOSIS — E78.5 HYPERLIPIDEMIA WITH TARGET LDL LESS THAN 130: ICD-10-CM

## 2022-05-11 RX ORDER — ROSUVASTATIN CALCIUM 40 MG/1
TABLET, COATED ORAL
Qty: 90 TABLET | Refills: 1 | Status: SHIPPED | OUTPATIENT
Start: 2022-05-11 | End: 2022-06-14

## 2022-06-14 DIAGNOSIS — E78.5 HYPERLIPIDEMIA WITH TARGET LDL LESS THAN 130: ICD-10-CM

## 2022-06-14 RX ORDER — ROSUVASTATIN CALCIUM 40 MG/1
TABLET, COATED ORAL
Qty: 90 TABLET | Refills: 1 | Status: SHIPPED | OUTPATIENT
Start: 2022-06-14

## 2022-09-11 DIAGNOSIS — E78.5 HYPERLIPIDEMIA WITH TARGET LDL LESS THAN 130: ICD-10-CM

## 2022-09-11 RX ORDER — LISINOPRIL 20 MG/1
TABLET ORAL
Qty: 90 TABLET | Refills: 1 | Status: SHIPPED | OUTPATIENT
Start: 2022-09-11

## 2023-03-30 ENCOUNTER — TRANSCRIBE ORDER (OUTPATIENT)
Dept: SCHEDULING | Age: 68
End: 2023-03-30

## 2023-03-30 DIAGNOSIS — Z12.31 SCREENING MAMMOGRAM FOR HIGH-RISK PATIENT: Primary | ICD-10-CM

## 2023-04-23 DIAGNOSIS — Z12.31 SCREENING MAMMOGRAM FOR HIGH-RISK PATIENT: Primary | ICD-10-CM

## 2023-04-25 ENCOUNTER — HOSPITAL ENCOUNTER (OUTPATIENT)
Dept: MAMMOGRAPHY | Age: 68
Discharge: HOME | End: 2023-04-25
Attending: INTERNAL MEDICINE
Payer: MEDICARE

## 2023-04-25 DIAGNOSIS — Z12.31 SCREENING MAMMOGRAM FOR HIGH-RISK PATIENT: ICD-10-CM

## 2023-04-25 PROCEDURE — 77067 SCR MAMMO BI INCL CAD: CPT

## 2023-06-06 ENCOUNTER — HOSPITAL ENCOUNTER (OUTPATIENT)
Facility: HOSPITAL | Age: 68
Setting detail: OUTPATIENT SURGERY
Discharge: HOME | End: 2023-06-06
Attending: SPECIALIST | Admitting: SPECIALIST
Payer: MEDICARE

## 2023-06-06 ENCOUNTER — ANESTHESIA (OUTPATIENT)
Facility: HOSPITAL | Age: 68
End: 2023-06-06
Payer: MEDICARE

## 2023-06-06 ENCOUNTER — ANESTHESIA EVENT (OUTPATIENT)
Facility: HOSPITAL | Age: 68
End: 2023-06-06
Payer: MEDICARE

## 2023-06-06 VITALS
TEMPERATURE: 97.5 F | OXYGEN SATURATION: 99 % | HEART RATE: 71 BPM | WEIGHT: 181.44 LBS | BODY MASS INDEX: 30.23 KG/M2 | HEIGHT: 65 IN | RESPIRATION RATE: 17 BRPM | SYSTOLIC BLOOD PRESSURE: 90 MMHG | DIASTOLIC BLOOD PRESSURE: 74 MMHG

## 2023-06-06 PROCEDURE — 7100000010 HC PHASE II RECOVERY - FIRST 15 MIN: Performed by: SPECIALIST

## 2023-06-06 PROCEDURE — 3600007502: Performed by: SPECIALIST

## 2023-06-06 PROCEDURE — 6360000002 HC RX W HCPCS: Performed by: NURSE ANESTHETIST, CERTIFIED REGISTERED

## 2023-06-06 PROCEDURE — 3700000000 HC ANESTHESIA ATTENDED CARE: Performed by: SPECIALIST

## 2023-06-06 PROCEDURE — 2580000003 HC RX 258: Performed by: SPECIALIST

## 2023-06-06 PROCEDURE — 3700000001 HC ADD 15 MINUTES (ANESTHESIA): Performed by: SPECIALIST

## 2023-06-06 PROCEDURE — 7100000011 HC PHASE II RECOVERY - ADDTL 15 MIN: Performed by: SPECIALIST

## 2023-06-06 PROCEDURE — 3600007512: Performed by: SPECIALIST

## 2023-06-06 PROCEDURE — 88305 TISSUE EXAM BY PATHOLOGIST: CPT

## 2023-06-06 PROCEDURE — 2500000003 HC RX 250 WO HCPCS: Performed by: NURSE ANESTHETIST, CERTIFIED REGISTERED

## 2023-06-06 RX ORDER — LIDOCAINE HYDROCHLORIDE 20 MG/ML
INJECTION, SOLUTION EPIDURAL; INFILTRATION; INTRACAUDAL; PERINEURAL PRN
Status: DISCONTINUED | OUTPATIENT
Start: 2023-06-06 | End: 2023-06-06 | Stop reason: SDUPTHER

## 2023-06-06 RX ORDER — SODIUM CHLORIDE 9 MG/ML
INJECTION, SOLUTION INTRAVENOUS CONTINUOUS
Status: DISCONTINUED | OUTPATIENT
Start: 2023-06-06 | End: 2023-06-06 | Stop reason: HOSPADM

## 2023-06-06 RX ORDER — SODIUM CHLORIDE 0.9 % (FLUSH) 0.9 %
5-40 SYRINGE (ML) INJECTION PRN
Status: DISCONTINUED | OUTPATIENT
Start: 2023-06-06 | End: 2023-06-06 | Stop reason: HOSPADM

## 2023-06-06 RX ORDER — SIMETHICONE 20 MG/.3ML
40 EMULSION ORAL
Status: DISCONTINUED | OUTPATIENT
Start: 2023-06-06 | End: 2023-06-06 | Stop reason: HOSPADM

## 2023-06-06 RX ORDER — PROPOFOL 10 MG/ML
INJECTION, EMULSION INTRAVENOUS CONTINUOUS PRN
Status: DISCONTINUED | OUTPATIENT
Start: 2023-06-06 | End: 2023-06-06 | Stop reason: SDUPTHER

## 2023-06-06 RX ADMIN — SODIUM CHLORIDE: 9 INJECTION, SOLUTION INTRAVENOUS at 08:35

## 2023-06-06 RX ADMIN — PROPOFOL 200 MCG/KG/MIN: 10 INJECTION, EMULSION INTRAVENOUS at 08:40

## 2023-06-06 RX ADMIN — LIDOCAINE HYDROCHLORIDE 60 MG: 20 INJECTION, SOLUTION EPIDURAL; INFILTRATION; INTRACAUDAL; PERINEURAL at 08:45

## 2023-06-06 ASSESSMENT — PAIN - FUNCTIONAL ASSESSMENT: PAIN_FUNCTIONAL_ASSESSMENT: 0-10

## 2023-06-06 NOTE — PROGRESS NOTES
Radha Carrizales  1955  [id number]    Situation:  Verbal report received from: [facility] RN   Procedure: Procedure(s) with comments:  COLONOSCOPY POLYPECTOMY SNARE/COLD BIOPSY - hot snare in sigmoid colon    Background:    Preoperative diagnosis: * No pre-op diagnosis entered *  Postoperative diagnosis: * No post-op diagnosis entered *    :  Dr. Tarah Moyer  Assistant(s): Circulator: Robinson Camp RN  Endoscopy Technician: Cornell Noel    Specimens:   ID Type Source Tests Collected by Time Destination   A : sigmoid colon polyp hot snare Tissue Sigmoid Colon SURGICAL PATHOLOGY Antony Beaver MD 6/6/2023 9461    B : ascending colon polyp Tissue Colon-Ascending SURGICAL PATHOLOGY Antony Beaver MD 6/6/2023 0848    C : transverse colon polyps x2 Tissue Colon-Transverse SURGICAL PATHOLOGY Antony Beaver MD 6/6/2023 0851      H. Pylori  No    Assessment:    Anesthesia gave intra-procedure sedation and medications, see anesthesia flow sheet No    Intravenous fluids: NS@ KVO     Vital signs stable     Abdominal assessment: round and soft     Recommendation:  Discharge patient per MD order.   Return to floor  Family or Friend   Permission to share finding with family or friend Yes

## 2023-06-06 NOTE — DISCHARGE INSTRUCTIONS
[address] [facility] LEATHA Welch MD  [phone]      June 6, 2023    Ras Oreilly  YOB: 1955    COLONOSCOPY DISCHARGE INSTRUCTIONS    If there is redness at IV site you should apply warm compress to area. If redness or soreness persist contact Dr. Patricia Welch' or your primary care doctor. There may be a slight amount of blood passed from the rectum. Gaseous discomfort may develop, but walking, belching will help relieve this. You may not operate a vehicle for 12 hours  You may not operate machinery or dangerous appliances for rest of today  You may not drink alcoholic beverages for 12 hours  Avoid making any critical decisions for 24 hours    DIET:  You may resume your normal diet, but some patients find that heavy or large meals may lead to indigestion or vomiting. I suggest a light meal as first food intake. MEDICATIONS:  The use of some over-the-counter pain medication may lead to bleeding after colon biopsies or polyp removal.  Tylenol (also called acetaminophen) is safe to take even if you have had colonoscopy with polyp removal.  Based on the procedure you had today you may not safely take aspirin or aspirin-like products for the next ten (10) days. Remember that Tylenol (also called acetaminophen) is safe to take after colonoscopy even if you have had biopsies or polyps removed. ACTIVITY:  You may resume your normal household activities, but it is recommended that you spend the remainder of the day resting -  avoid any strenuous activity. CALL DR. Selena Johnson' OFFICE IF:  Increasing pain, nausea, vomiting  Abdominal distension (swelling)  Significant new or increased bleeding (oral or rectal)  Fever/Chills  Chest pain/shortness of breath                       Additional instructions:   Great news, no colon cancer.   We found and removed four small polyps and I'll reach out to you with the polyp results

## 2023-06-06 NOTE — ANESTHESIA POSTPROCEDURE EVALUATION
Department of Anesthesiology  Postprocedure Note    Patient: Julio C Valenzuela  MRN: [id number]  YOB: 1955  Date of evaluation: 6/6/2023      Procedure Summary     Date: 06/06/23 Room / Location: [facility] ENDO 02 / [facility] ENDOSCOPY    Anesthesia Start: 1457 Anesthesia Stop: 0900    Procedure: COLONOSCOPY POLYPECTOMY SNARE/COLD BIOPSY (Lower GI Region) Diagnosis: Screen for colon cancer    Surgeons: Douglas Morales MD Responsible Provider: Veronica Ordoñez MD    Anesthesia Type: MAC ASA Status: 2          Anesthesia Type: No value filed.     Deonte Phase I: Deonte Score: 10    Deonte Phase II: Deonte Score: 10      Anesthesia Post Evaluation    Patient location during evaluation: PACU  Patient participation: complete - patient participated  Level of consciousness: awake and awake and alert  Pain score: 0  Airway patency: patent  Nausea & Vomiting: no vomiting and no nausea  Complications: no  Cardiovascular status: hemodynamically stable  Respiratory status: acceptable  Hydration status: euvolemic

## 2023-06-06 NOTE — PROGRESS NOTES
Endoscopy discharge instructions have been reviewed and given to patient. The patient verbalized understanding and acceptance of instructions. Dr. Jin Bush discussed with patient procedure findings and next steps.

## 2023-06-06 NOTE — ANESTHESIA PRE PROCEDURE
MCV 96.5 03/31/2022 02:40 PM    RDW 15.5 03/31/2022 02:40 PM     03/31/2022 02:40 PM       CMP:   Lab Results   Component Value Date/Time     03/31/2022 02:40 PM    K 4.4 03/31/2022 02:40 PM     03/31/2022 02:40 PM    CO2 23 03/31/2022 02:40 PM    BUN 17 03/31/2022 02:40 PM    CREATININE 1.23 03/31/2022 02:40 PM    GFRAA 53 03/31/2022 02:40 PM    AGRATIO 1.0 03/31/2022 02:40 PM    GLUCOSE 94 03/31/2022 02:40 PM    PROT 8.0 03/31/2022 02:40 PM    CALCIUM 9.6 03/31/2022 02:40 PM    BILITOT 0.4 03/31/2022 02:40 PM    ALKPHOS 88 03/31/2022 02:40 PM    AST 52 03/31/2022 02:40 PM    ALT 55 03/31/2022 02:40 PM       POC Tests: No results for input(s): POCGLU, POCNA, POCK, POCCL, POCBUN, POCHEMO, POCHCT in the last 72 hours. Coags: No results found for: PROTIME, INR, APTT    HCG (If Applicable): No results found for: PREGTESTUR, PREGSERUM, HCG, HCGQUANT     ABGs: No results found for: PHART, PO2ART, QZT4SVF, FHO1CBX, BEART, A6OWQCEB     Type & Screen (If Applicable):  No results found for: LABABO, LABRH    Drug/Infectious Status (If Applicable):  No results found for: HIV, HEPCAB    COVID-19 Screening (If Applicable): No results found for: COVID19        Anesthesia Evaluation  Patient summary reviewed  Airway: Mallampati: III  TM distance: >3 FB   Neck ROM: full  Mouth opening: > = 3 FB   Dental:          Pulmonary:Negative Pulmonary ROS and normal exam                               Cardiovascular:  Exercise tolerance: good (>4 METS),   (+) hypertension: moderate, hyperlipidemia                  Neuro/Psych:   (+) headaches: migraine headaches, psychiatric history:depression/anxiety    (-) neuromuscular disease           GI/Hepatic/Renal: Neg GI/Hepatic/Renal ROS            Endo/Other:    (+) DiabetesType II DM, , .                 Abdominal: normal exam            Vascular: Other Findings:           Anesthesia Plan      MAC     ASA 2       Induction: intravenous.       Anesthetic plan and

## 2023-06-06 NOTE — OP NOTE
[address]  [phone]      2023    Colonoscopy Procedure Note  Julio C Valenzuela  :  1955  [facility] Medical Record Number: [id number]    Indications:     Screening colonoscopy  PCP:  Bill Mcgrath MD  Anesthesia/Sedation: Conscious Sedation/Moderate Sedation/GETA, see notes  Endoscopist:  Dr. Cruz Dover  Complications:  None  Estimated Blood Loss:  None    Permit:  The indications, risks, benefits and alternatives were reviewed with the patient or their decision maker who was provided an opportunity to ask questions and all questions were answered. The specific risks of colonoscopy with conscious sedation were reviewed, including but not limited to anesthetic complication, bleeding, adverse drug reaction, missed lesion, infection, IV site reactions, and intestinal perforation which would lead to the need for surgical repair. Alternatives to colonoscopy including radiographic imaging, observation without testing, or laboratory testing were reviewed including the limitations of those alternatives. After considering the options and having all their questions answered, the patient or their decision maker provided both verbal and written consent to proceed. Procedure in Detail:  After obtaining informed consent, positioning of the patient in the left lateral decubitus position, and conduction of a pre-procedure pause or \"time out\" the endoscope was introduced into the anus and advanced to the cecum, which was identified by the ileocecal valve and appendiceal orifice. The quality of the colonic preparation was good. A careful inspection was made as the colonoscope was withdrawn, findings and interventions are described below.     Findings:   Ascending polyp 2mm taken with cold forceps; transverse polyps 4mm 5mm removed with cold snare; pedunculated 9mm sigmoid polyp taken with

## 2023-06-06 NOTE — H&P
69 y.o. female for open access colonoscopy for screening   Additional data for completion of the targeted pre-endoscopy H&P will be provided under 'H&P interval notes'. Please see that document which will be attached to this.   Benson Ahmadi MD
Azathioprine Other (See Comments)     Elevated Cr and elevated LFTs    Methotrexate Other (See Comments)     headache    Diclofenac Sodium Palpitations and Dizziness or Vertigo      Prior to Admission Medications   Prescriptions Last Dose Informant Patient Reported? Taking? ALPRAZolam (XANAX) 0.5 MG tablet 6/5/2023  Yes No   Sig: Take by mouth 3 times daily as needed. albuterol sulfate HFA (PROVENTIL;VENTOLIN;PROAIR) 108 (90 Base) MCG/ACT inhaler Past Month  Yes No   Sig: Inhale 2 puffs into the lungs every 4 hours as needed   buPROPion (WELLBUTRIN XL) 300 MG extended release tablet 6/5/2023  Yes No   Sig: TAKE ONE TABLET BY MOUTH EVERY MORNING   lisinopril (PRINIVIL;ZESTRIL) 20 MG tablet 6/5/2023  Yes No   Sig: TAKE ONE TABLET BY MOUTH DAILY   rosuvastatin (CRESTOR) 40 MG tablet 6/5/2023  Yes No   Sig: TAKE ONE TABLET BY MOUTH EVERY NIGHT AT BEDTIME      Facility-Administered Medications: None       PHYSICAL EXAM:  The patient is examined immediately prior to the procedure. Vitals:    06/06/23 0803   BP: (!) 154/73   Pulse: 66   Resp: 14   Temp: 98.4 °F (36.9 °C)   SpO2: 100%     Gen: Appears comfortable, no distress. Pulm: comfortable respirations with no abnormal audible breath sounds  HEART: well perfused, no abnormal audible heart sounds  GI: abdomen flat. PLAN:  Informed consent discussion held, patient afforded an opportunity to ask questions and all questions answered. After being advised of the risks, benefits, and alternatives, the patient requested that we proceed and indicated so on a written consent form. Will proceed with procedure as planned.   Jack Back MD

## 2023-06-06 NOTE — PROGRESS NOTES

## 2023-07-05 DIAGNOSIS — F33.41 MAJOR DEPRESSIVE DISORDER, RECURRENT, IN PARTIAL REMISSION (HCC): Primary | ICD-10-CM

## 2023-07-05 DIAGNOSIS — F40.243 FEAR OF FLYING: ICD-10-CM

## 2023-07-05 RX ORDER — SCOLOPAMINE TRANSDERMAL SYSTEM 1 MG/1
1 PATCH, EXTENDED RELEASE TRANSDERMAL
Qty: 5 PATCH | Refills: 0 | Status: SHIPPED | OUTPATIENT
Start: 2023-07-05

## 2023-07-05 RX ORDER — ALPRAZOLAM 0.5 MG/1
0.5 TABLET ORAL 3 TIMES DAILY PRN
Qty: 15 TABLET | Refills: 0 | Status: SHIPPED | OUTPATIENT
Start: 2023-07-05 | End: 2023-08-05

## 2023-07-05 NOTE — TELEPHONE ENCOUNTER
Spoke with patient and advised she is overdue for a AWV. She states understanding and scheduled for 8/10/23. She asks if Dr. Neo Zuniga will prescribe also a seasickness medication because she is going on a cruise. Grateful for the call Dr. Neo Zuniga notified.

## 2023-07-05 NOTE — TELEPHONE ENCOUNTER
Refill rx request:    ALPRAZolam Cecy Euceda 0.5 MG tablet      Send to    [facility] [id number] [address], [address] [place] [address] [address] - F [phone]

## 2023-08-10 ENCOUNTER — OFFICE VISIT (OUTPATIENT)
Age: 68
End: 2023-08-10
Payer: MEDICARE

## 2023-08-10 VITALS
SYSTOLIC BLOOD PRESSURE: 125 MMHG | HEART RATE: 81 BPM | WEIGHT: 174.2 LBS | BODY MASS INDEX: 29.02 KG/M2 | HEIGHT: 65 IN | TEMPERATURE: 98.9 F | OXYGEN SATURATION: 98 % | RESPIRATION RATE: 19 BRPM | DIASTOLIC BLOOD PRESSURE: 70 MMHG

## 2023-08-10 DIAGNOSIS — Z23 NEED FOR PROPHYLACTIC VACCINATION AGAINST STREPTOCOCCUS PNEUMONIAE (PNEUMOCOCCUS): ICD-10-CM

## 2023-08-10 DIAGNOSIS — Z00.00 MEDICARE ANNUAL WELLNESS VISIT, SUBSEQUENT: Primary | ICD-10-CM

## 2023-08-10 DIAGNOSIS — I10 PRIMARY HYPERTENSION: ICD-10-CM

## 2023-08-10 DIAGNOSIS — E78.5 HYPERLIPIDEMIA WITH TARGET LOW DENSITY LIPOPROTEIN (LDL) CHOLESTEROL LESS THAN 130 MG/DL: ICD-10-CM

## 2023-08-10 DIAGNOSIS — F33.41 MAJOR DEPRESSIVE DISORDER, RECURRENT, IN PARTIAL REMISSION (HCC): ICD-10-CM

## 2023-08-10 DIAGNOSIS — R73.01 IFG (IMPAIRED FASTING GLUCOSE): ICD-10-CM

## 2023-08-10 DIAGNOSIS — Z11.59 NEED FOR HEPATITIS C SCREENING TEST: ICD-10-CM

## 2023-08-10 PROCEDURE — 99213 OFFICE O/P EST LOW 20 MIN: CPT | Performed by: INTERNAL MEDICINE

## 2023-08-10 PROCEDURE — PBSHW PNEUMOCOCCAL, PCV20, PREVNAR 20, (AGE 18 YRS+), IM, PF: Performed by: INTERNAL MEDICINE

## 2023-08-10 PROCEDURE — 3078F DIAST BP <80 MM HG: CPT | Performed by: INTERNAL MEDICINE

## 2023-08-10 PROCEDURE — 3074F SYST BP LT 130 MM HG: CPT | Performed by: INTERNAL MEDICINE

## 2023-08-10 PROCEDURE — 1123F ACP DISCUSS/DSCN MKR DOCD: CPT | Performed by: INTERNAL MEDICINE

## 2023-08-10 PROCEDURE — G0009 ADMIN PNEUMOCOCCAL VACCINE: HCPCS | Performed by: INTERNAL MEDICINE

## 2023-08-10 PROCEDURE — G0439 PPPS, SUBSEQ VISIT: HCPCS | Performed by: INTERNAL MEDICINE

## 2023-08-10 PROCEDURE — 90677 PCV20 VACCINE IM: CPT | Performed by: INTERNAL MEDICINE

## 2023-08-10 RX ORDER — ZOSTER VACCINE RECOMBINANT, ADJUVANTED 50 MCG/0.5
0.5 KIT INTRAMUSCULAR SEE ADMIN INSTRUCTIONS
Qty: 0.5 ML | Refills: 0 | Status: SHIPPED | OUTPATIENT
Start: 2023-08-10 | End: 2024-02-06

## 2023-08-10 SDOH — ECONOMIC STABILITY: FOOD INSECURITY: WITHIN THE PAST 12 MONTHS, THE FOOD YOU BOUGHT JUST DIDN'T LAST AND YOU DIDN'T HAVE MONEY TO GET MORE.: NEVER TRUE

## 2023-08-10 SDOH — ECONOMIC STABILITY: INCOME INSECURITY: HOW HARD IS IT FOR YOU TO PAY FOR THE VERY BASICS LIKE FOOD, HOUSING, MEDICAL CARE, AND HEATING?: NOT HARD AT ALL

## 2023-08-10 SDOH — ECONOMIC STABILITY: HOUSING INSECURITY
IN THE LAST 12 MONTHS, WAS THERE A TIME WHEN YOU DID NOT HAVE A STEADY PLACE TO SLEEP OR SLEPT IN A SHELTER (INCLUDING NOW)?: NO

## 2023-08-10 SDOH — ECONOMIC STABILITY: FOOD INSECURITY: WITHIN THE PAST 12 MONTHS, YOU WORRIED THAT YOUR FOOD WOULD RUN OUT BEFORE YOU GOT MONEY TO BUY MORE.: NEVER TRUE

## 2023-08-10 ASSESSMENT — PATIENT HEALTH QUESTIONNAIRE - PHQ9
SUM OF ALL RESPONSES TO PHQ QUESTIONS 1-9: 0
10. IF YOU CHECKED OFF ANY PROBLEMS, HOW DIFFICULT HAVE THESE PROBLEMS MADE IT FOR YOU TO DO YOUR WORK, TAKE CARE OF THINGS AT HOME, OR GET ALONG WITH OTHER PEOPLE: 0
SUM OF ALL RESPONSES TO PHQ QUESTIONS 1-9: 0
2. FEELING DOWN, DEPRESSED OR HOPELESS: 0
1. LITTLE INTEREST OR PLEASURE IN DOING THINGS: 0
4. FEELING TIRED OR HAVING LITTLE ENERGY: 0
3. TROUBLE FALLING OR STAYING ASLEEP: 0
SUM OF ALL RESPONSES TO PHQ9 QUESTIONS 1 & 2: 0
SUM OF ALL RESPONSES TO PHQ QUESTIONS 1-9: 0
5. POOR APPETITE OR OVEREATING: 0
6. FEELING BAD ABOUT YOURSELF - OR THAT YOU ARE A FAILURE OR HAVE LET YOURSELF OR YOUR FAMILY DOWN: 0
9. THOUGHTS THAT YOU WOULD BE BETTER OFF DEAD, OR OF HURTING YOURSELF: 0
7. TROUBLE CONCENTRATING ON THINGS, SUCH AS READING THE NEWSPAPER OR WATCHING TELEVISION: 0
SUM OF ALL RESPONSES TO PHQ QUESTIONS 1-9: 0
8. MOVING OR SPEAKING SO SLOWLY THAT OTHER PEOPLE COULD HAVE NOTICED. OR THE OPPOSITE, BEING SO FIGETY OR RESTLESS THAT YOU HAVE BEEN MOVING AROUND A LOT MORE THAN USUAL: 0

## 2023-08-10 ASSESSMENT — LIFESTYLE VARIABLES
HOW OFTEN DO YOU HAVE A DRINK CONTAINING ALCOHOL: MONTHLY OR LESS
HOW MANY STANDARD DRINKS CONTAINING ALCOHOL DO YOU HAVE ON A TYPICAL DAY: 1 OR 2

## 2023-08-11 NOTE — PROGRESS NOTES
Patient present for routine immunizations. Pt denies any symptoms , reactions or allergies that would exclude them from being immunized today. Risks and adverse reactions were discussed and the VIS was given to them. All questions were addressed. Pt was observed for 10 min post injection. There were no reactions observed.
Azathioprine Other (See Comments)     Elevated Cr and elevated LFTs    Metformin     Methotrexate Other (See Comments)     headache    Diclofenac Sodium Palpitations and Dizziness or Vertigo     Prior to Visit Medications    Medication Sig Taking?  Authorizing Provider   SHINGRIX 50 MCG/0.5ML SUSR injection Inject 0.5 mLs into the muscle See Admin Instructions 1 dose now and repeat dose in 2-6 months Yes Jluis Perez MD   Tdap (ADACEL) 5-2-15.5 LF-MCG/0.5 injection Inject 0.5 mLs into the muscle once for 1 dose Yes Jluis Perez MD   albuterol sulfate HFA (PROVENTIL;VENTOLIN;PROAIR) 108 (90 Base) MCG/ACT inhaler Inhale 2 puffs into the lungs every 4 hours as needed Yes Ar Automatic Reconciliation   buPROPion (WELLBUTRIN XL) 300 MG extended release tablet TAKE ONE TABLET BY MOUTH EVERY MORNING Yes Ar Automatic Reconciliation   lisinopril (PRINIVIL;ZESTRIL) 20 MG tablet TAKE ONE TABLET BY MOUTH DAILY Yes Ar Automatic Reconciliation   rosuvastatin (CRESTOR) 40 MG tablet TAKE ONE TABLET BY MOUTH EVERY NIGHT AT BEDTIME Yes Ar Automatic Reconciliation       CareTeam (Including outside providers/suppliers regularly involved in providing care):   Patient Care Team:  Jluis Perez MD as PCP - MD Tonya as PCP - Empaneled Provider     Reviewed and updated this visit:  Tobacco  Allergies  Meds  Problems  Med Hx  Surg Hx  Soc Hx  Fam Hx

## 2023-08-15 DIAGNOSIS — N17.9 AKI (ACUTE KIDNEY INJURY) (HCC): Primary | ICD-10-CM

## 2023-08-16 ENCOUNTER — TELEPHONE (OUTPATIENT)
Age: 68
End: 2023-08-16

## 2023-08-16 NOTE — TELEPHONE ENCOUNTER
Spoke with patient and updated on Dr. Liu Lee comment regarding her recent labs values and need for kidney ultra sound and 3 month follow up labs. She states understanding and [facility] scheduling numbers provided. She schedule for 11/17/23 at 0840 AM. She states understanding and grateful for the call.

## 2023-08-21 ENCOUNTER — HOSPITAL ENCOUNTER (OUTPATIENT)
Facility: HOSPITAL | Age: 68
Discharge: HOME | End: 2023-08-24
Attending: INTERNAL MEDICINE
Payer: MEDICARE

## 2023-08-21 DIAGNOSIS — N17.9 AKI (ACUTE KIDNEY INJURY) (HCC): ICD-10-CM

## 2023-08-21 PROCEDURE — 76770 US EXAM ABDO BACK WALL COMP: CPT

## 2023-09-13 ENCOUNTER — TELEPHONE (OUTPATIENT)
Age: 68
End: 2023-09-13

## 2023-09-13 DIAGNOSIS — E78.5 HYPERLIPIDEMIA WITH TARGET LOW DENSITY LIPOPROTEIN (LDL) CHOLESTEROL LESS THAN 130 MG/DL: Primary | ICD-10-CM

## 2023-09-13 RX ORDER — ROSUVASTATIN CALCIUM 40 MG/1
40 TABLET, COATED ORAL NIGHTLY
Qty: 90 TABLET | Refills: 2 | Status: SHIPPED | OUTPATIENT
Start: 2023-09-13

## 2023-09-13 NOTE — TELEPHONE ENCOUNTER
Refill rx:  rosuvastatin (CRESTOR) 40 MG tablet      Send to:  [address] [facility] [id number] - [place] [address] [address] - F [phone]

## 2023-10-27 RX ORDER — LISINOPRIL 20 MG/1
TABLET ORAL
Qty: 90 TABLET | Refills: 2 | Status: SHIPPED | OUTPATIENT
Start: 2023-10-27

## 2023-11-17 ENCOUNTER — OFFICE VISIT (OUTPATIENT)
Age: 68
End: 2023-11-17
Payer: MEDICARE

## 2023-11-17 VITALS
HEART RATE: 64 BPM | WEIGHT: 174.8 LBS | BODY MASS INDEX: 29.12 KG/M2 | TEMPERATURE: 98.2 F | DIASTOLIC BLOOD PRESSURE: 78 MMHG | HEIGHT: 65 IN | SYSTOLIC BLOOD PRESSURE: 129 MMHG | OXYGEN SATURATION: 97 % | RESPIRATION RATE: 18 BRPM

## 2023-11-17 DIAGNOSIS — R79.89 ELEVATED SERUM CREATININE: ICD-10-CM

## 2023-11-17 DIAGNOSIS — I10 PRIMARY HYPERTENSION: ICD-10-CM

## 2023-11-17 DIAGNOSIS — R73.01 IFG (IMPAIRED FASTING GLUCOSE): Primary | ICD-10-CM

## 2023-11-17 LAB
ANION GAP SERPL CALC-SCNC: 7 MMOL/L (ref 5–15)
APPEARANCE UR: ABNORMAL
BACTERIA URNS QL MICRO: ABNORMAL /HPF
BILIRUB UR QL: NEGATIVE
BUN SERPL-MCNC: 15 MG/DL (ref 6–20)
BUN/CREAT SERPL: 15 (ref 12–20)
CALCIUM SERPL-MCNC: 9.5 MG/DL (ref 8.5–10.1)
CHLORIDE SERPL-SCNC: 109 MMOL/L (ref 97–108)
CO2 SERPL-SCNC: 24 MMOL/L (ref 21–32)
COLOR UR: ABNORMAL
CREAT SERPL-MCNC: 0.99 MG/DL (ref 0.55–1.02)
CREAT UR-MCNC: 96.2 MG/DL
EPITH CASTS URNS QL MICRO: ABNORMAL /LPF
GLUCOSE SERPL-MCNC: 81 MG/DL (ref 65–100)
GLUCOSE UR STRIP.AUTO-MCNC: NEGATIVE MG/DL
HGB UR QL STRIP: NEGATIVE
HYALINE CASTS URNS QL MICRO: ABNORMAL /LPF (ref 0–5)
KETONES UR QL STRIP.AUTO: NEGATIVE MG/DL
LEUKOCYTE ESTERASE UR QL STRIP.AUTO: ABNORMAL
NITRITE UR QL STRIP.AUTO: NEGATIVE
PH UR STRIP: 5.5 (ref 5–8)
POTASSIUM SERPL-SCNC: 3.9 MMOL/L (ref 3.5–5.1)
PROT UR STRIP-MCNC: ABNORMAL MG/DL
PROT UR-MCNC: 30 MG/DL (ref 0–11.9)
PROT/CREAT UR-RTO: 0.3
RBC #/AREA URNS HPF: ABNORMAL /HPF (ref 0–5)
SODIUM SERPL-SCNC: 140 MMOL/L (ref 136–145)
SP GR UR REFRACTOMETRY: 1.01 (ref 1–1.03)
SPECIMEN HOLD: NORMAL
UROBILINOGEN UR QL STRIP.AUTO: 0.2 EU/DL (ref 0.2–1)
WBC URNS QL MICRO: ABNORMAL /HPF (ref 0–4)

## 2023-11-17 PROCEDURE — 3074F SYST BP LT 130 MM HG: CPT | Performed by: INTERNAL MEDICINE

## 2023-11-17 PROCEDURE — 3078F DIAST BP <80 MM HG: CPT | Performed by: INTERNAL MEDICINE

## 2023-11-17 PROCEDURE — 99213 OFFICE O/P EST LOW 20 MIN: CPT | Performed by: INTERNAL MEDICINE

## 2023-11-17 PROCEDURE — 1123F ACP DISCUSS/DSCN MKR DOCD: CPT | Performed by: INTERNAL MEDICINE

## 2023-11-18 NOTE — PROGRESS NOTES
HISTORY OF PRESENT ILLNESS    Chief Complaint   Patient presents with    Lab Collection     Fasting. Hypertension       Presents for follow-up    Worsening renal function. Was found to have creatinine 1.55 August 10, 2023, previously was 1.23 March 31, 2022. Renal ultrasound on August 22, 2023 showed no obvious medical renal disease or other abnormalities. She says she is keeping fairly well-hydrated. Does not use any excessive NSAIDs. Takes lisinopril. No prior history of CKD. Impaired fasting glucose / Pre-diabetes follow-up  No results found for: \"GLU\"  Last hemoglobin a1c   Hemoglobin A1C   Date Value Ref Range Status   08/10/2023 5.6 4.0 - 5.6 % Final     Comment:     NEW METHOD  PLEASE NOTE NEW REFERENCE RANGE  (NOTE)  HbA1C Interpretive Ranges  <5.7              Normal  5.7 - 6.4         Consider Prediabetes  >6.5              Consider Diabetes     Diabetic diet compliance: compliant most of the time. Patient does not perform home glucose monitoring. Hypertension  Hypertension ROS: taking medications as instructed, no medication side effects noted, no TIA's, no chest pain on exertion, no dyspnea on exertion, no swelling of ankles     reports that she quit smoking about 15 years ago. Her smoking use included cigarettes. She has a 12.50 pack-year smoking history. She has never used smokeless tobacco.    reports current alcohol use.    BP Readings from Last 2 Encounters:   11/17/23 129/78   08/10/23 125/70             Review of Systems   All other systems reviewed and are negative, except as noted in HPI    Past Medical and Surgical History   has a past medical history of Arthralgia of left ankle, Cardiac murmur, Depression, major, EBA (epidermolysis bullosa acquisita) ([address]), Eczema, FH: breast cancer in first degree relative, HTN (hypertension), Hyperlipidemia LDL goal < 130, Iliotibial band syndrome, Migraine, Sickle cell trait ([address]), Steroid-induced diabetes mellitus ([address]), and Ulnar

## 2024-02-09 DIAGNOSIS — F43.21 ADJUSTMENT DISORDER WITH DEPRESSED MOOD: Primary | ICD-10-CM

## 2024-02-09 RX ORDER — BUPROPION HYDROCHLORIDE 300 MG/1
TABLET ORAL
Qty: 90 TABLET | Refills: 1 | Status: SHIPPED | OUTPATIENT
Start: 2024-02-09

## 2024-02-29 NOTE — PROGRESS NOTES
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 has a past surgical history that includes colonoscopy (9/2011) and hina and bso (2002). reports that she quit smoking about 8 years ago. Her smoking use included Cigarettes. She has a 5.00 pack-year smoking history. She has never used smokeless tobacco. She reports that she does not drink alcohol or use illicit drugs. family history includes Heart Disease in an other family member; Hypertension in her mother; Stroke in her father. Physical Exam   Nursing note and vitals reviewed. Blood pressure 122/77, pulse 90, temperature 98.3 °F (36.8 °C), temperature source Oral, resp. rate 12, height 5' 5\" (1.651 m), weight 168 lb (76.2 kg), last menstrual period 01/01/2002. Constitutional:  No distress. Eyes: Conjunctivae are normal.   Ears:  Hearing grossly intact  Cardiovascular: Normal rate. regular rhythm, no murmurs or gallops  No edema  Pulmonary/Chest: Effort normal.   CTAB  Musculoskeletal: moves all 4 extremities   Neurological: Alert and oriented to person, place, and time. Skin: No rash noted. Psychiatric: Normal mood and affect. Behavior is normal.   Foot exam  - skin intact, mild dryness w no fissures, no rashes, no significant ulcers or callous formation. Sensation intact by microfilament or light touch      ASSESSMENT and Aundra Feliz was seen today for diabetes.     Diagnoses and all Report called to Phase II.    orders for this visit:    Steroid-induced diabetes mellitus ([facility] - controlled on no meds  -     AMB POC HEMOGLOBIN A1C  -     LIPID PANEL  -     METABOLIC PANEL, COMPREHENSIVE  -     MICROALBUMIN, UR, RAND W/ MICROALBUMIN/CREA RATIO    EBA (epidermolysis bullosa acquisita) - doing very well. Per derm. Essential hypertension - Controlled on current regimen. Continue current medications as written in chart.  -     CBC W/O DIFF    Hyperlipidemia with target LDL less than 130    Visit for screening mammogram  -     [facility] MAMMO BI SCREENING INCL CAD; Future    Recurrent major depressive disorder, in partial remission ([email or web address] - going well. Wean off wellbutrin  -     buPROPion XL (WELLBUTRIN XL) 150 mg tablet; Take 1 Tab by mouth every morning. Decreased dose 2/13/17    Other orders  -     CVD REPORT  -     CKD REPORT  -     DIABETES PATIENT EDUCATION        There are no Patient Instructions on file for this visit.    lab results and schedule of future lab studies reviewed with patient  reviewed medications and side effects in detail    Return to clinic for further evaluation if new symptoms develop    Follow-up Disposition: Not on File    Current Outpatient Prescriptions   Medication Sig    buPROPion XL (WELLBUTRIN XL) 150 mg tablet Take 1 Tab by mouth every morning. Decreased dose 2/13/17    lisinopril (PRINIVIL, ZESTRIL) 10 mg tablet Take 1 Tab by mouth daily. Indications: Hypertension    CRESTOR 40 mg tablet Take 1 Tab by mouth nightly. For cholesterol    dapsone 25 mg tablet Take  by mouth daily.  ALPRAZolam (XANAX) 0.5 mg tablet Take 1 Tab by mouth three (3) times daily as needed for Sleep or Anxiety.  glucose blood VI test strips (ONETOUCH ULTRA TEST) strip USE TO CHECK BLOOD SUGAR THREE TIMES A DAY BEFORE MEALS    albuterol (PROAIR HFA) 90 mcg/actuation inhaler Take 2 Puffs by inhalation every four (4) hours as needed for Wheezing or Shortness of Breath.      No current facility-administered medications for this visit.

## 2024-04-18 ENCOUNTER — TELEPHONE (OUTPATIENT)
Age: 69
End: 2024-04-18

## 2024-04-18 NOTE — TELEPHONE ENCOUNTER
Spoke with Patient and she reports she was seen yesterday at [facility] whom told her she has some pneumonia in her lungs and to follow up with PCP today or tomorrow. I did inform patient that provider was off today and tomorrow. She reports that they did prescribe her antibiotic and cough medication. Scheduled with KIRA Mojica of 4/22/24 at 2:20 PM. Advised- The following steps can help your body recover from pneumonia.  Choose heart-healthy foods, because good nutrition helps your body recover.  Drink plenty of fluids to help you stay hydrated.  Don't drink alcohol or use illegal drugs. ...  Don't smoke and avoid secondhand smoke. ...  Get plenty of sleep. ...  Get light physical activity. Advised if she should become SOB and overly fatigued to return to Urgent/ER for evaluation and treatment between now and her appointment. She states understanding and grateful for the call. Grateful for the call. Record requested from [facility]        [phone]

## 2024-04-18 NOTE — TELEPHONE ENCOUNTER
Patient was calling to state she was seen yesterday at [facility] whom told her she has some pneumonia in her lungs and to follow up with PCP today or tomorrow  I did inform patient that provider was off today and tomorrow, she wishes to speak with nurse when possible

## 2024-04-22 ENCOUNTER — OFFICE VISIT (OUTPATIENT)
Age: 69
End: 2024-04-22
Payer: COMMERCIAL

## 2024-04-22 VITALS
WEIGHT: 169 LBS | DIASTOLIC BLOOD PRESSURE: 68 MMHG | HEART RATE: 69 BPM | HEIGHT: 65 IN | TEMPERATURE: 97.8 F | RESPIRATION RATE: 16 BRPM | BODY MASS INDEX: 28.16 KG/M2 | OXYGEN SATURATION: 99 % | SYSTOLIC BLOOD PRESSURE: 108 MMHG

## 2024-04-22 DIAGNOSIS — J18.9 PNEUMONIA OF RIGHT LOWER LOBE DUE TO INFECTIOUS ORGANISM: Primary | ICD-10-CM

## 2024-04-22 DIAGNOSIS — F33.41 MAJOR DEPRESSIVE DISORDER, RECURRENT, IN PARTIAL REMISSION (HCC): ICD-10-CM

## 2024-04-22 DIAGNOSIS — I10 PRIMARY HYPERTENSION: ICD-10-CM

## 2024-04-22 PROCEDURE — 1123F ACP DISCUSS/DSCN MKR DOCD: CPT | Performed by: NURSE PRACTITIONER

## 2024-04-22 PROCEDURE — 99213 OFFICE O/P EST LOW 20 MIN: CPT | Performed by: NURSE PRACTITIONER

## 2024-04-22 PROCEDURE — 3074F SYST BP LT 130 MM HG: CPT | Performed by: NURSE PRACTITIONER

## 2024-04-22 PROCEDURE — 3078F DIAST BP <80 MM HG: CPT | Performed by: NURSE PRACTITIONER

## 2024-04-22 RX ORDER — DOXYCYCLINE HYCLATE 100 MG/1
100 CAPSULE ORAL 2 TIMES DAILY
COMMUNITY
Start: 2024-04-17

## 2024-04-22 ASSESSMENT — ENCOUNTER SYMPTOMS
GASTROINTESTINAL NEGATIVE: 1
EYE PAIN: 0
RHINORRHEA: 0
SINUS PAIN: 0
BACK PAIN: 0
EYE REDNESS: 0
NAUSEA: 0
WHEEZING: 0
VOMITING: 0
SORE THROAT: 0
CHEST TIGHTNESS: 0
SHORTNESS OF BREATH: 0
CONSTIPATION: 0
RESPIRATORY NEGATIVE: 1
SINUS PRESSURE: 0
BLOOD IN STOOL: 0
DIARRHEA: 0
EYES NEGATIVE: 1
ABDOMINAL PAIN: 0

## 2024-04-22 NOTE — PROGRESS NOTES
Assessment and Plan     1. Pneumonia of right lower lobe due to infectious organism: Symptoms improved, benign physical exam. Continue with Doxycycline as rx'd. Rest and hydration recommended. Chest x-ray for resolution recommended in 6 weeks. SXS to return discussed. Pt verbalized understanding.   -     XR CHEST (2 VW); Future  2. Major depressive disorder, recurrent, in partial remission (HCC): Continue with Bupropion.  Assessment & Plan:   Well-controlled, continue current medications  3. Primary hypertension: At goal. Continue with Lisinopril and low sodium diet.      BP Readings from Last 3 Encounters:   04/22/24 108/68   11/17/23 129/78   08/10/23 125/70     Benefits, risks, possible drug interactions, and side effects of all new medications were reviewed with the patient. Pt verbalized understanding.    An electronic signature was used to authenticate this note.  Bre Hagan, APRN - CNP  4/22/2024      Follow-up and Dispositions    Return if symptoms worsen or fail to improve.          History of Present Illness   Chief Complaint     Cary Richey is a 68 y.o. female here for had concerns including Follow-up (Mrs. Richey presents today for an urgent care follow up due pneumonia. Patient is still taking antibiotic treatment. She is still experiencing productive cough. No other symptoms).   Mrs. Richey presents today for urgent care visit follows up. Pt presented to [facility] urgent care 5 days ago due to cough, headache, ear pain. Had a chest x-raym she was diagnosed with PNA to R lung lower lobe. She was tested negative for influenza and COVID-19 with negative findings. She was started on Doxycyline and given cough suppressant. Denies any side effects of medication treatment. Does not remember cough suppressant name and she is not sure how many she has left of antibiotics. Reports most symptoms had resolved. Cough is significantly better, positive for some fatigue. Denies fever, chills, 
Price (Do Not Change): 0.00
Instructions: This plan will send the code FBSE to the PM system.  DO NOT or CHANGE the price.
Detail Level: Simple

## 2024-05-07 ENCOUNTER — TELEPHONE (OUTPATIENT)
Age: 69
End: 2024-05-07

## 2024-05-07 NOTE — TELEPHONE ENCOUNTER
The patient is requesting a call back to discuss pneumonia. She stated that last month she was diagnose with it. Psr offered an appointment she decline and stated that she will like to be advised. [phone]

## 2024-05-08 ENCOUNTER — TELEPHONE (OUTPATIENT)
Age: 69
End: 2024-05-08

## 2024-05-08 DIAGNOSIS — R05.9 COUGH, UNSPECIFIED TYPE: Primary | ICD-10-CM

## 2024-05-08 RX ORDER — BENZONATATE 100 MG/1
100-200 CAPSULE ORAL 3 TIMES DAILY PRN
Qty: 21 CAPSULE | Refills: 0 | Status: SHIPPED | OUTPATIENT
Start: 2024-05-08 | End: 2024-05-15

## 2024-05-08 NOTE — TELEPHONE ENCOUNTER
Spoke with patient and updated on NP Galina's comment, recommendations and script sent to her pharmacy for Tessalon for her cough. She states understanding and grateful for the call.

## 2024-05-08 NOTE — TELEPHONE ENCOUNTER
Spoke with patient and she reports that last month she had pneumonia and cough persists with SOB at night when laying down. Denies any fever. She is asking for something for the cough so she can sleep at night. Grateful for the call.

## 2024-07-22 ENCOUNTER — TELEPHONE (OUTPATIENT)
Age: 69
End: 2024-07-22

## 2024-07-22 DIAGNOSIS — F40.243 FEAR OF FLYING: ICD-10-CM

## 2024-07-22 RX ORDER — ALPRAZOLAM 0.5 MG/1
0.5 TABLET ORAL 3 TIMES DAILY PRN
Qty: 15 TABLET | Refills: 0 | Status: SHIPPED | OUTPATIENT
Start: 2024-07-22 | End: 2024-08-22

## 2024-07-31 DIAGNOSIS — E78.5 HYPERLIPIDEMIA WITH TARGET LOW DENSITY LIPOPROTEIN (LDL) CHOLESTEROL LESS THAN 130 MG/DL: ICD-10-CM

## 2024-07-31 DIAGNOSIS — F43.21 ADJUSTMENT DISORDER WITH DEPRESSED MOOD: ICD-10-CM

## 2024-07-31 RX ORDER — ROSUVASTATIN CALCIUM 40 MG/1
40 TABLET, COATED ORAL NIGHTLY
Qty: 90 TABLET | Refills: 2 | Status: SHIPPED | OUTPATIENT
Start: 2024-07-31

## 2024-07-31 RX ORDER — BUPROPION HYDROCHLORIDE 300 MG/1
300 TABLET ORAL EVERY MORNING
Qty: 90 TABLET | Refills: 0 | Status: SHIPPED | OUTPATIENT
Start: 2024-07-31

## 2024-07-31 RX ORDER — LISINOPRIL 20 MG/1
20 TABLET ORAL DAILY
Qty: 90 TABLET | Refills: 0 | Status: SHIPPED | OUTPATIENT
Start: 2024-07-31

## 2024-08-20 DIAGNOSIS — E78.5 HYPERLIPIDEMIA WITH TARGET LOW DENSITY LIPOPROTEIN (LDL) CHOLESTEROL LESS THAN 130 MG/DL: ICD-10-CM

## 2024-08-20 DIAGNOSIS — F43.21 ADJUSTMENT DISORDER WITH DEPRESSED MOOD: ICD-10-CM

## 2024-08-20 DIAGNOSIS — I10 PRIMARY HYPERTENSION: Primary | ICD-10-CM

## 2024-08-20 RX ORDER — ROSUVASTATIN CALCIUM 40 MG/1
40 TABLET, COATED ORAL NIGHTLY
Qty: 90 TABLET | Refills: 2 | Status: SHIPPED | OUTPATIENT
Start: 2024-08-20

## 2024-08-20 RX ORDER — LISINOPRIL 20 MG/1
20 TABLET ORAL DAILY
Qty: 90 TABLET | Refills: 0 | Status: SHIPPED | OUTPATIENT
Start: 2024-08-20

## 2024-08-20 RX ORDER — BUPROPION HYDROCHLORIDE 300 MG/1
300 TABLET ORAL EVERY MORNING
Qty: 90 TABLET | Refills: 0 | Status: SHIPPED | OUTPATIENT
Start: 2024-08-20

## 2024-09-30 ENCOUNTER — OFFICE VISIT (OUTPATIENT)
Age: 69
End: 2024-09-30
Payer: MEDICARE

## 2024-09-30 VITALS
SYSTOLIC BLOOD PRESSURE: 118 MMHG | WEIGHT: 169 LBS | HEART RATE: 66 BPM | DIASTOLIC BLOOD PRESSURE: 67 MMHG | OXYGEN SATURATION: 96 % | HEIGHT: 65 IN | BODY MASS INDEX: 28.16 KG/M2 | TEMPERATURE: 98 F | RESPIRATION RATE: 19 BRPM

## 2024-09-30 DIAGNOSIS — Z12.31 SCREENING MAMMOGRAM, ENCOUNTER FOR: ICD-10-CM

## 2024-09-30 DIAGNOSIS — R73.01 IFG (IMPAIRED FASTING GLUCOSE): ICD-10-CM

## 2024-09-30 DIAGNOSIS — I10 PRIMARY HYPERTENSION: ICD-10-CM

## 2024-09-30 DIAGNOSIS — M21.962 FOOT DEFORMITY, ACQUIRED, LEFT: Primary | ICD-10-CM

## 2024-09-30 DIAGNOSIS — Z23 NEEDS FLU SHOT: ICD-10-CM

## 2024-09-30 PROBLEM — H54.7 VISUAL IMPAIRMENT: Status: ACTIVE | Noted: 2024-09-30

## 2024-09-30 PROCEDURE — 99214 OFFICE O/P EST MOD 30 MIN: CPT | Performed by: INTERNAL MEDICINE

## 2024-09-30 PROCEDURE — 3074F SYST BP LT 130 MM HG: CPT | Performed by: INTERNAL MEDICINE

## 2024-09-30 PROCEDURE — 90653 IIV ADJUVANT VACCINE IM: CPT | Performed by: INTERNAL MEDICINE

## 2024-09-30 PROCEDURE — G0008 ADMIN INFLUENZA VIRUS VAC: HCPCS | Performed by: INTERNAL MEDICINE

## 2024-09-30 PROCEDURE — 3078F DIAST BP <80 MM HG: CPT | Performed by: INTERNAL MEDICINE

## 2024-09-30 PROCEDURE — 1123F ACP DISCUSS/DSCN MKR DOCD: CPT | Performed by: INTERNAL MEDICINE

## 2024-09-30 SDOH — ECONOMIC STABILITY: FOOD INSECURITY: WITHIN THE PAST 12 MONTHS, YOU WORRIED THAT YOUR FOOD WOULD RUN OUT BEFORE YOU GOT MONEY TO BUY MORE.: NEVER TRUE

## 2024-09-30 SDOH — ECONOMIC STABILITY: INCOME INSECURITY: HOW HARD IS IT FOR YOU TO PAY FOR THE VERY BASICS LIKE FOOD, HOUSING, MEDICAL CARE, AND HEATING?: NOT HARD AT ALL

## 2024-09-30 SDOH — ECONOMIC STABILITY: FOOD INSECURITY: WITHIN THE PAST 12 MONTHS, THE FOOD YOU BOUGHT JUST DIDN'T LAST AND YOU DIDN'T HAVE MONEY TO GET MORE.: NEVER TRUE

## 2024-09-30 NOTE — PROGRESS NOTES
\"Have you been to the ER, urgent care clinic since your last visit?  Hospitalized since your last visit?\"    NO    “Have you seen or consulted any other health care providers outside our system since your last visit?”    NO    Have you had a mammogram?”   YES - Where: 2023 Keegan Saenz Nurse/CMA to request most recent records if not in the chart    Date of last Mammogram: 4/25/2023                  Patient present for routine immunizations.   Pt denies any symptoms , reactions or allergies that would exclude them from being immunized today.  Risks and adverse reactions were discussed and the VIS was given to them. All questions were addressed.  Pt was observed for 10 min post injection. There were no reactions observed.

## 2024-10-01 LAB
ALBUMIN SERPL-MCNC: 4.6 G/DL (ref 3.9–4.9)
ALP SERPL-CCNC: 99 IU/L (ref 44–121)
ALT SERPL-CCNC: 24 IU/L (ref 0–32)
AST SERPL-CCNC: 40 IU/L (ref 0–40)
BILIRUB SERPL-MCNC: 0.5 MG/DL (ref 0–1.2)
BUN SERPL-MCNC: 12 MG/DL (ref 8–27)
BUN/CREAT SERPL: 11 (ref 12–28)
CALCIUM SERPL-MCNC: 10.2 MG/DL (ref 8.7–10.3)
CHLORIDE SERPL-SCNC: 105 MMOL/L (ref 96–106)
CHOLEST SERPL-MCNC: 211 MG/DL (ref 100–199)
CO2 SERPL-SCNC: 22 MMOL/L (ref 20–29)
CREAT SERPL-MCNC: 1.08 MG/DL (ref 0.57–1)
EGFRCR SERPLBLD CKD-EPI 2021: 55 ML/MIN/1.73
ERYTHROCYTE [DISTWIDTH] IN BLOOD BY AUTOMATED COUNT: 14.9 % (ref 11.7–15.4)
GLOBULIN SER CALC-MCNC: 3.1 G/DL (ref 1.5–4.5)
GLUCOSE SERPL-MCNC: 81 MG/DL (ref 70–99)
HBA1C MFR BLD: 6.1 % (ref 4.8–5.6)
HCT VFR BLD AUTO: 40 % (ref 34–46.6)
HDLC SERPL-MCNC: 63 MG/DL
HGB BLD-MCNC: 13 G/DL (ref 11.1–15.9)
IMP & REVIEW OF LAB RESULTS: NORMAL
LDLC SERPL CALC-MCNC: 124 MG/DL (ref 0–99)
MCH RBC QN AUTO: 31.6 PG (ref 26.6–33)
MCHC RBC AUTO-ENTMCNC: 32.5 G/DL (ref 31.5–35.7)
MCV RBC AUTO: 97 FL (ref 79–97)
PLATELET # BLD AUTO: 146 X10E3/UL (ref 150–450)
POTASSIUM SERPL-SCNC: 4.7 MMOL/L (ref 3.5–5.2)
PROT SERPL-MCNC: 7.7 G/DL (ref 6–8.5)
RBC # BLD AUTO: 4.12 X10E6/UL (ref 3.77–5.28)
REPORT: NORMAL
REPORT: NORMAL
SODIUM SERPL-SCNC: 142 MMOL/L (ref 134–144)
TRIGL SERPL-MCNC: 137 MG/DL (ref 0–149)
VLDLC SERPL CALC-MCNC: 24 MG/DL (ref 5–40)
WBC # BLD AUTO: 7.5 X10E3/UL (ref 3.4–10.8)

## 2024-10-01 NOTE — PROGRESS NOTES
HISTORY OF PRESENT ILLNESS    Chief Complaint   Patient presents with    Foot Pain     Left - knot        Presents for follow-up    Doing fairly well overall.    She is concerned about bony protuberances on the dorsum of her left greater than right foot.  Has been present for several months but has been resting a little bit.  It is not very tender or swollen.  Denies any injuries.    Impaired fasting glucose / Pre-diabetes follow-up  Last hemoglobin a1c   Hemoglobin A1C   Date Value Ref Range Status   08/10/2023 5.6 4.0 - 5.6 % Final     Comment:     NEW METHOD  PLEASE NOTE NEW REFERENCE RANGE  (NOTE)  HbA1C Interpretive Ranges  <5.7              Normal  5.7 - 6.4         Consider Prediabetes  >6.5              Consider Diabetes     Diabetic diet compliance: compliant most of the time. Patient does not perform home glucose monitoring.      Hypertension  Hypertension ROS: taking medications as instructed, no medication side effects noted, no TIA's, no chest pain on exertion, no dyspnea on exertion, no swelling of ankles     reports that she quit smoking about 16 years ago. Her smoking use included cigarettes. She started smoking about 41 years ago. She has a 12.5 pack-year smoking history. She has never used smokeless tobacco.    reports current alcohol use.   BP Readings from Last 2 Encounters:   09/30/24 118/67   04/22/24 108/68           Review of Systems   All other systems reviewed and are negative, except as noted in HPI    Past Medical and Surgical History   has a past medical history of Arthralgia of left ankle, Cardiac murmur, Depression, major, EBA (epidermolysis bullosa acquisita) (HCC), Eczema, FH: breast cancer in first degree relative, HTN (hypertension), Hyperlipidemia LDL goal < 130, Iliotibial band syndrome, Migraine, Sickle cell trait (HCC), Steroid-induced diabetes mellitus (HCC), and Ulnar neuropathy of right upper extremity.     has a past surgical history that includes other surgical history

## 2024-11-25 ENCOUNTER — HOSPITAL ENCOUNTER (OUTPATIENT)
Facility: HOSPITAL | Age: 69
Discharge: HOME | End: 2024-11-28
Payer: MEDICARE

## 2024-11-25 DIAGNOSIS — Z12.31 SCREENING MAMMOGRAM, ENCOUNTER FOR: ICD-10-CM

## 2024-11-25 PROCEDURE — 77063 BREAST TOMOSYNTHESIS BI: CPT

## 2025-04-16 ENCOUNTER — TELEPHONE (OUTPATIENT)
Facility: CLINIC | Age: 70
End: 2025-04-16

## 2025-04-16 DIAGNOSIS — E78.5 HYPERLIPIDEMIA WITH TARGET LOW DENSITY LIPOPROTEIN (LDL) CHOLESTEROL LESS THAN 130 MG/DL: ICD-10-CM

## 2025-04-16 RX ORDER — ROSUVASTATIN CALCIUM 40 MG/1
40 TABLET, COATED ORAL NIGHTLY
Qty: 30 TABLET | Refills: 0 | Status: SHIPPED | OUTPATIENT
Start: 2025-04-16

## 2025-04-16 NOTE — TELEPHONE ENCOUNTER
Refill request. Medication is usually through mail order. Patient has one pill remaining and would like a partial fill to be sent to a local pharmacy.      rosuvastatin (CRESTOR) 40 MG tablet       [facility] [id number] [facility] [address] - P [phone] - F [phone]

## 2025-04-22 DIAGNOSIS — F43.21 ADJUSTMENT DISORDER WITH DEPRESSED MOOD: ICD-10-CM

## 2025-04-22 DIAGNOSIS — I10 PRIMARY HYPERTENSION: ICD-10-CM

## 2025-04-22 RX ORDER — LISINOPRIL 20 MG/1
20 TABLET ORAL DAILY
Qty: 90 TABLET | Refills: 0 | Status: SHIPPED | OUTPATIENT
Start: 2025-04-22

## 2025-04-22 RX ORDER — BUPROPION HYDROCHLORIDE 300 MG/1
300 TABLET ORAL EVERY MORNING
Qty: 90 TABLET | Refills: 0 | Status: SHIPPED | OUTPATIENT
Start: 2025-04-22 | End: 2025-04-25 | Stop reason: SDUPTHER

## 2025-04-25 ENCOUNTER — TELEPHONE (OUTPATIENT)
Facility: CLINIC | Age: 70
End: 2025-04-25

## 2025-04-25 DIAGNOSIS — F43.21 ADJUSTMENT DISORDER WITH DEPRESSED MOOD: ICD-10-CM

## 2025-04-25 RX ORDER — BUPROPION HYDROCHLORIDE 300 MG/1
300 TABLET ORAL EVERY MORNING
Qty: 90 TABLET | Refills: 0 | Status: SHIPPED | OUTPATIENT
Start: 2025-04-25

## 2025-04-25 NOTE — TELEPHONE ENCOUNTER
The patient is requesting a Rx refill on the medication listed below:   buPROPion (WELLBUTRIN XL) 300 MG extended release tablet     [facility] [id number] [place] [id number] - P [phone] - F [phone]

## 2025-04-28 DIAGNOSIS — E78.5 HYPERLIPIDEMIA WITH TARGET LOW DENSITY LIPOPROTEIN (LDL) CHOLESTEROL LESS THAN 130 MG/DL: ICD-10-CM

## 2025-04-28 DIAGNOSIS — I10 PRIMARY HYPERTENSION: ICD-10-CM

## 2025-04-28 DIAGNOSIS — F43.21 ADJUSTMENT DISORDER WITH DEPRESSED MOOD: ICD-10-CM

## 2025-04-28 RX ORDER — BUPROPION HYDROCHLORIDE 300 MG/1
300 TABLET ORAL EVERY MORNING
Qty: 90 TABLET | Refills: 0 | Status: SHIPPED | OUTPATIENT
Start: 2025-04-28

## 2025-04-28 RX ORDER — LISINOPRIL 20 MG/1
20 TABLET ORAL DAILY
Qty: 90 TABLET | Refills: 0 | Status: SHIPPED | OUTPATIENT
Start: 2025-04-28

## 2025-04-28 RX ORDER — ROSUVASTATIN CALCIUM 40 MG/1
40 TABLET, COATED ORAL NIGHTLY
Qty: 30 TABLET | Refills: 0 | Status: SHIPPED | OUTPATIENT
Start: 2025-04-28

## 2025-05-31 ENCOUNTER — PATIENT MESSAGE (OUTPATIENT)
Facility: CLINIC | Age: 70
End: 2025-05-31

## 2025-05-31 DIAGNOSIS — F40.243 FEAR OF FLYING: Primary | ICD-10-CM

## 2025-06-02 RX ORDER — ALPRAZOLAM 0.5 MG
0.5 TABLET ORAL 3 TIMES DAILY PRN
Qty: 16 TABLET | Refills: 0 | Status: SHIPPED | OUTPATIENT
Start: 2025-06-02 | End: 2025-07-02

## 2025-08-10 DIAGNOSIS — I10 PRIMARY HYPERTENSION: ICD-10-CM

## 2025-08-11 DIAGNOSIS — E78.5 HYPERLIPIDEMIA WITH TARGET LOW DENSITY LIPOPROTEIN (LDL) CHOLESTEROL LESS THAN 130 MG/DL: ICD-10-CM

## 2025-08-11 RX ORDER — LISINOPRIL 20 MG/1
20 TABLET ORAL DAILY
Qty: 90 TABLET | Refills: 0 | Status: SHIPPED | OUTPATIENT
Start: 2025-08-11

## 2025-08-11 RX ORDER — ROSUVASTATIN CALCIUM 40 MG/1
40 TABLET, COATED ORAL NIGHTLY
Qty: 90 TABLET | Refills: 0 | Status: SHIPPED | OUTPATIENT
Start: 2025-08-11

## 2025-08-21 ENCOUNTER — OFFICE VISIT (OUTPATIENT)
Facility: CLINIC | Age: 70
End: 2025-08-21
Payer: MEDICARE

## 2025-08-21 VITALS
HEART RATE: 98 BPM | BODY MASS INDEX: 28.16 KG/M2 | WEIGHT: 169 LBS | DIASTOLIC BLOOD PRESSURE: 88 MMHG | HEIGHT: 65 IN | SYSTOLIC BLOOD PRESSURE: 132 MMHG

## 2025-08-21 DIAGNOSIS — M89.8X1 PAIN OF RIGHT SCAPULA: ICD-10-CM

## 2025-08-21 DIAGNOSIS — R20.2 PARESTHESIA OF RIGHT UPPER EXTREMITY: Primary | ICD-10-CM

## 2025-08-21 DIAGNOSIS — M79.2 NEUROPATHIC PAIN: ICD-10-CM

## 2025-08-21 PROCEDURE — 1160F RVW MEDS BY RX/DR IN RCRD: CPT | Performed by: INTERNAL MEDICINE

## 2025-08-21 PROCEDURE — 1125F AMNT PAIN NOTED PAIN PRSNT: CPT | Performed by: INTERNAL MEDICINE

## 2025-08-21 PROCEDURE — 3075F SYST BP GE 130 - 139MM HG: CPT | Performed by: INTERNAL MEDICINE

## 2025-08-21 PROCEDURE — 3079F DIAST BP 80-89 MM HG: CPT | Performed by: INTERNAL MEDICINE

## 2025-08-21 PROCEDURE — 1123F ACP DISCUSS/DSCN MKR DOCD: CPT | Performed by: INTERNAL MEDICINE

## 2025-08-21 PROCEDURE — 1159F MED LIST DOCD IN RCRD: CPT | Performed by: INTERNAL MEDICINE

## 2025-08-21 PROCEDURE — 99213 OFFICE O/P EST LOW 20 MIN: CPT | Performed by: INTERNAL MEDICINE

## 2025-08-21 RX ORDER — METHYLPREDNISOLONE 4 MG/1
TABLET ORAL
Qty: 1 KIT | Refills: 0 | Status: SHIPPED | OUTPATIENT
Start: 2025-08-21 | End: 2025-08-21 | Stop reason: ALTCHOICE

## 2025-08-21 RX ORDER — METHYLPREDNISOLONE 4 MG/1
TABLET ORAL
Qty: 1 KIT | Refills: 0 | Status: SHIPPED | OUTPATIENT
Start: 2025-08-21

## 2025-08-21 RX ORDER — LIDOCAINE 50 MG/G
1 PATCH TOPICAL DAILY
Qty: 10 PATCH | Refills: 0 | Status: SHIPPED | OUTPATIENT
Start: 2025-08-21 | End: 2025-08-31

## 2025-08-21 ASSESSMENT — PATIENT HEALTH QUESTIONNAIRE - PHQ9
5. POOR APPETITE OR OVEREATING: NOT AT ALL
2. FEELING DOWN, DEPRESSED OR HOPELESS: NOT AT ALL
7. TROUBLE CONCENTRATING ON THINGS, SUCH AS READING THE NEWSPAPER OR WATCHING TELEVISION: NOT AT ALL
6. FEELING BAD ABOUT YOURSELF - OR THAT YOU ARE A FAILURE OR HAVE LET YOURSELF OR YOUR FAMILY DOWN: NOT AT ALL
DEPRESSION UNABLE TO ASSESS: PT REFUSES
SUM OF ALL RESPONSES TO PHQ QUESTIONS 1-9: 0
SUM OF ALL RESPONSES TO PHQ QUESTIONS 1-9: 0
3. TROUBLE FALLING OR STAYING ASLEEP: NOT AT ALL
9. THOUGHTS THAT YOU WOULD BE BETTER OFF DEAD, OR OF HURTING YOURSELF: NOT AT ALL
10. IF YOU CHECKED OFF ANY PROBLEMS, HOW DIFFICULT HAVE THESE PROBLEMS MADE IT FOR YOU TO DO YOUR WORK, TAKE CARE OF THINGS AT HOME, OR GET ALONG WITH OTHER PEOPLE: NOT DIFFICULT AT ALL
SUM OF ALL RESPONSES TO PHQ QUESTIONS 1-9: 0
SUM OF ALL RESPONSES TO PHQ QUESTIONS 1-9: 0
1. LITTLE INTEREST OR PLEASURE IN DOING THINGS: NOT AT ALL
8. MOVING OR SPEAKING SO SLOWLY THAT OTHER PEOPLE COULD HAVE NOTICED. OR THE OPPOSITE, BEING SO FIGETY OR RESTLESS THAT YOU HAVE BEEN MOVING AROUND A LOT MORE THAN USUAL: NOT AT ALL
4. FEELING TIRED OR HAVING LITTLE ENERGY: NOT AT ALL

## 2025-08-28 DIAGNOSIS — M79.2 NEUROPATHIC PAIN: ICD-10-CM

## 2025-08-28 DIAGNOSIS — R20.2 PARESTHESIA OF RIGHT UPPER EXTREMITY: Primary | ICD-10-CM

## 2025-08-28 DIAGNOSIS — M89.8X1 PAIN OF RIGHT SCAPULA: ICD-10-CM

## 2025-08-28 RX ORDER — GABAPENTIN 300 MG/1
300 CAPSULE ORAL 2 TIMES DAILY
Qty: 60 CAPSULE | Refills: 0 | Status: SHIPPED | OUTPATIENT
Start: 2025-08-28 | End: 2025-09-27